# Patient Record
Sex: FEMALE | Race: WHITE | NOT HISPANIC OR LATINO | Employment: FULL TIME | URBAN - METROPOLITAN AREA
[De-identification: names, ages, dates, MRNs, and addresses within clinical notes are randomized per-mention and may not be internally consistent; named-entity substitution may affect disease eponyms.]

---

## 2024-08-14 ENCOUNTER — HOSPITAL ENCOUNTER (EMERGENCY)
Facility: OTHER | Age: 59
Discharge: HOME | End: 2024-08-14
Attending: EMERGENCY MEDICINE
Payer: COMMERCIAL

## 2024-08-14 VITALS
TEMPERATURE: 99 F | WEIGHT: 156 LBS | HEART RATE: 115 BPM | OXYGEN SATURATION: 99 % | BODY MASS INDEX: 24.48 KG/M2 | RESPIRATION RATE: 18 BRPM | SYSTOLIC BLOOD PRESSURE: 145 MMHG | DIASTOLIC BLOOD PRESSURE: 70 MMHG | HEIGHT: 67 IN

## 2024-08-14 DIAGNOSIS — N63.11 MASS OF UPPER OUTER QUADRANT OF RIGHT BREAST: Primary | ICD-10-CM

## 2024-08-14 PROCEDURE — 99281 EMR DPT VST MAYX REQ PHY/QHP: CPT

## 2024-08-14 NOTE — ED PROVIDER NOTES
Encounter Date: 8/14/2024       History     Chief Complaint   Patient presents with    Breast Mass     Pt reports noticing ~golf ball sized lump in R breast this AM, denies pain     Patient is a 59 y.o. female who presents to the ED for evaluation of lump in her right breast that she noticed this morning. Patient attempted to go to the [facility], but was unable to get an appointment. States that the mass is located on the lateral aspect of her right breast. Lump is non-tender. No known personal or family history of breast cancer. She denies skin changes to the breast.  She denies nipple discharge. She denies any recent vigorous or repetitive use of pectoral muscles.  No associated neck, back, or shoulder problems.  No associated fever or erythema.  No recent history of trauma to the chest.  Patient states that she has not noticed any swollen lymph nodes.    Denies fever, chills, chest pain, shortness of breath, wheezing, stridor, drooling, NVD, abdominal pain, constipation, urinary problems, joint problems, rashes, or any other complaints at this time.      The history is provided by the patient.     Review of patient's allergies indicates:  No Known Allergies  No past medical history on file.  No past surgical history on file.  No family history on file.     Review of Systems   Constitutional:  Negative for chills and fever.   Skin:  Negative for color change and rash.        Lump in right breast       Physical Exam     Initial Vitals [08/14/24 1029]   BP Pulse Resp Temp SpO2   (!) 190/105 (!) 122 18 99.3 °F (37.4 °C) 99 %      MAP       --         Physical Exam    Constitutional: She appears well-developed and well-nourished.   HENT:   Head: Normocephalic and atraumatic.   Pulmonary/Chest:   Right breast exhibits mass. Right breast exhibits no inverted nipple, no nipple discharge, no skin change and no tenderness. Breasts are symmetrical.     Neurological: She is alert.         ED Course    Procedures  Labs Reviewed - No data to display       Imaging Results    None          Medications - No data to display  Medical Decision Making  Patient is an afebrile, well appearing 59 y.o. female who presents for evaluation of a mass in her right breast. VSSPatient does not have a personal/family hx of breast cancer. Denies chest pain, SOB, or any other complaints at this time. A&Ox4. The patient remained comfortable and stable during their visit in the ED. Details of ED course documented in ED workup.     Differential diagnosis includes, but is not limited to:  Breast cancer, fibroadenoma, breast abscess, mastitis, cellulitis, mastitis, fibrocystic changes    All historical, clinical findings reviewed. Palpable nontender mass at 9 o'clock in the right breast. No surrounding warmth, erythema to suggest abscess or cellulitis. No nipple discharge.  Nipple is not inverted.  No peau d'orange.  Breasts are symmetrical, breasts are not warm or swollen.  No adenopathy is present in the cervical, supraclavicular, infraclavicular or axillary regions.     I believe that patient needs close follow up with OBGYN and/or breast surgery for an urgent mammogram as an outpatient.  I have placed a referral.  I also gave patient information about the woman's walk-in clinic.  There are no concerning features on physical exam to suggest an emergent or life threatening condition that requires immediate intervention in the emergency department. No further intervention is indicated at this time and I am of the belief that that it is safe to discharge the patient from the emergency department.     Patient has been counseled regarding the need for follow-up as well as the indications to return to the emergency room should new or worrisome developments (including but not limited to worsening pain, swelling, swelling, fever) occur. Discharge and follow-up instructions discussed with the patient who expressed understanding and willingness  to comply with recommendations. Patient discharged from the emergency department in stable condition, in no acute distress.               ED Course as of 08/14/24 1622   Wed Aug 14, 2024   1218 Patient is tachycardic, but appears to be anxious. No chest pain, SOB, fever.  [OB]      ED Course User Index  [OB] Yasmin Rodriguez PA-C                           Clinical Impression:  Final diagnoses:  [N63.11] Mass of upper outer quadrant of right breast (Primary)          ED Disposition Condition    Discharge           ED Prescriptions    None       Follow-up Information    None          Yasmin Rodriguez PA-C  08/14/24 1622

## 2024-08-14 NOTE — DISCHARGE INSTRUCTIONS
Thank you for letting me care for you today. I have placed an urgent referral to OBGYN and Breast Surgery.  You can call [phone] to schedule. Or schedule on the [employer] shona.     You can also try to make a same-day appointment at the [facility].   [facility] [place]  [address]  [place] [address]     To make a same-day appointment, call us at [phone]. You can also walk into our office.     Our goal at [facility] is to always give you outstanding care and exceptional service. You may receive a survey by mail or email in the next week about your experience in our ED. We would greatly appreciate you completing and returning the survey. Your feedback provides us with a way to recognize our staff who give very good care and it helps us learn how to improve when your experience was below our aspiration of excellence.     All the best,     Yasmni Rodriguez, MPH, PA-C  Emergency Department Physician Assistant  [facility]

## 2024-08-14 NOTE — FIRST PROVIDER EVALUATION
Emergency Department TeleTriage Encounter Note      CHIEF COMPLAINT    Chief Complaint   Patient presents with    Breast Mass     Pt reports noticing ~golf ball sized lump in R breast this AM, denies pain       VITAL SIGNS   Initial Vitals [08/14/24 1029]   BP Pulse Resp Temp SpO2   (!) 190/105 (!) 122 18 99.3 °F (37.4 °C) 99 %      MAP       --            ALLERGIES    Review of patient's allergies indicates:  No Known Allergies    PROVIDER TRIAGE NOTE  TeleTriage Note: Blossom Mccain, a nontoxic/well appearing, 59 y.o. female, presented to the ED with c/o lump to her right breast that she noticed this morning. Denies redness or pain to the area. Denies nipple discharge. No hx of breast cancer.     All ED beds are full at present; patient notified of this status.  Patient seen and medically screened by Nurse Practitioner via teletriage. Orders initiated at triage to expedite care.  Patient is stable to return to the waiting room and will be placed in an ED bed when available.  Care will be transferred to an alternate provider when patient has been placed in an Exam Room from the [facility] for physical exam, additional orders, and disposition.  10:32 AM Gela Choe DNP, FNP-C        ORDERS  Labs Reviewed - No data to display    ED Orders (720h ago, onward)      None              Virtual Visit Note: The provider triage portion of this emergency department evaluation and documentation was performed via [employer], a HIPAA-compliant telemedicine application, in concert with a tele-presenter in the room. A face to face patient evaluation with one of my colleagues will occur once the patient is placed in an emergency department room.      DISCLAIMER: This note was prepared with [employer] voice recognition transcription software. Garbled syntax, mangled pronouns, and other bizarre constructions may be attributed to that software system.

## 2024-08-14 NOTE — ED TRIAGE NOTES
"Patient reports feeling large, painless lump in right breast today. Last mammogram about 3 years ago, "WNL". Denie drainage, discoloration, swelling. No distress noted.  "

## 2024-08-15 ENCOUNTER — OFFICE VISIT (OUTPATIENT)
Dept: OBSTETRICS AND GYNECOLOGY | Facility: CLINIC | Age: 59
End: 2024-08-15
Payer: COMMERCIAL

## 2024-08-15 ENCOUNTER — HOSPITAL ENCOUNTER (OUTPATIENT)
Dept: RADIOLOGY | Facility: OTHER | Age: 59
Discharge: HOME | End: 2024-08-15
Payer: COMMERCIAL

## 2024-08-15 VITALS
DIASTOLIC BLOOD PRESSURE: 98 MMHG | WEIGHT: 149.25 LBS | SYSTOLIC BLOOD PRESSURE: 190 MMHG | HEIGHT: 67 IN | BODY MASS INDEX: 23.43 KG/M2

## 2024-08-15 DIAGNOSIS — N63.10 MASS OF RIGHT BREAST, UNSPECIFIED QUADRANT: ICD-10-CM

## 2024-08-15 DIAGNOSIS — N63.11 MASS OF UPPER OUTER QUADRANT OF RIGHT BREAST: ICD-10-CM

## 2024-08-15 DIAGNOSIS — N63.10 MASS OF RIGHT BREAST, UNSPECIFIED QUADRANT: Primary | ICD-10-CM

## 2024-08-15 DIAGNOSIS — R03.0 ELEVATED BP WITHOUT DIAGNOSIS OF HYPERTENSION: ICD-10-CM

## 2024-08-15 PROCEDURE — 99999 PR PBB SHADOW E&M-EST. PATIENT-LVL III: CPT | Mod: PBBFAC,,,

## 2024-08-15 PROCEDURE — 77062 BREAST TOMOSYNTHESIS BI: CPT | Mod: 26,,, | Performed by: RADIOLOGY

## 2024-08-15 PROCEDURE — 77062 BREAST TOMOSYNTHESIS BI: CPT | Mod: TC

## 2024-08-15 PROCEDURE — 76642 ULTRASOUND BREAST LIMITED: CPT | Mod: 26,RT,, | Performed by: RADIOLOGY

## 2024-08-15 PROCEDURE — 77066 DX MAMMO INCL CAD BI: CPT | Mod: 26,,, | Performed by: RADIOLOGY

## 2024-08-15 PROCEDURE — 77066 DX MAMMO INCL CAD BI: CPT | Mod: TC

## 2024-08-15 PROCEDURE — 76642 ULTRASOUND BREAST LIMITED: CPT | Mod: TC,RT

## 2024-08-15 NOTE — PROGRESS NOTES
History & Physical  Gynecology      SUBJECTIVE:     Chief Complaint:   Breast Pain (Lump in right breast )     History of Present Illness  Blossom Mccain is a 59 y.o. female to walk in clinic for breast lump.. Was seen by ED yesterday- no mammo ordered- has referral in to breast surgery. She reports she noticed right breast lump yesterday. No painful. She denies skin changes.  She denies nipple discharge. She is not breastfeeding or pumping.  She denies any lumps to her left breast.  Denies any recent vigorous or repetitive use of pectoral muscles.  No associated neck, back, or shoulder problems.  No associated fever or erythema.  No recent history of trauma to the chest.  She has not tried any alleviating factors.  The pain does not affect her ability to perform daily activities.  Denies family history of breast cancer.  Has had normal mammograms in the past- has not established care since moving here.     BP Readings from Last 2 Encounters:   08/15/24 (!) 190/98   24 (!) 145/70          Review of patient's allergies indicates:  No Known Allergies    Past Medical History:   Diagnosis Date    Essential (primary) hypertension     MVP (mitral valve prolapse)      Past Surgical History:   Procedure Laterality Date    TUBAL LIGATION       OB History          2    Para        Term                AB   1    Living   1         SAB   1    IAB        Ectopic        Multiple        Live Births                   No family history on file.  Social History     Tobacco Use    Smoking status: Never    Smokeless tobacco: Never   Substance Use Topics    Alcohol use: Yes       No current outpatient medications on file.     No current facility-administered medications for this visit.     Review of Systems:  Review of Systems   Constitutional:  Negative for fever.   Respiratory:  Negative for cough and shortness of breath.    Cardiovascular:  Negative for chest pain and leg swelling.   Integumentary:  Negative for  nipple discharge, breast skin changes and breast tenderness.   Breast: Positive for lump.Negative for nipple discharge, skin changes and tenderness       OBJECTIVE:     Physical Exam:  Physical Exam  Exam conducted with a chaperone present.   Pulmonary:      Effort: Pulmonary effort is normal.   Chest:   Breasts:     Right: Mass present. No inverted nipple, nipple discharge, skin change or tenderness.      Left: Normal. No nipple discharge, skin change or tenderness.          Comments: 3x3 cm breast lump- non TTP  Lymphadenopathy:      Upper Body:      Right upper body: No supraclavicular adenopathy.      Left upper body: No supraclavicular adenopathy.   Skin:     General: Skin is warm and dry.   Neurological:      Mental Status: She is alert and oriented to person, place, and time.   Psychiatric:         Behavior: Behavior normal.         ASSESSMENT:       ICD-10-CM ICD-9-CM    1. Mass of right breast, unspecified quadrant  N63.10 611.72 Mammo Digital Diagnostic Bilat with Stefano      US Breast Right Limited      2. Elevated BP without diagnosis of hypertension  R03.0 796.2 Ambulatory referral/consult to Internal Medicine        Plan:      - Diagnostic Mammo and ultrasound ordered  - Referral to internal medicine for primary care hx of MVP and HTN  - Breast surgery referral already in place      I spent a total of 20 minutes on the day of the visit.This includes face to face time and non-face to face time preparing to see the patient (eg, review of tests), Obtaining and/or reviewing separately obtained history, Documenting clinical information in the electronic or other health record, Independently interpreting resultsand communicating results to the patient/family/caregiver, or Care coordination     REMBERTO QuintanillaC

## 2024-08-21 ENCOUNTER — LAB VISIT (OUTPATIENT)
Dept: LAB | Facility: HOSPITAL | Age: 59
End: 2024-08-21
Attending: STUDENT IN AN ORGANIZED HEALTH CARE EDUCATION/TRAINING PROGRAM
Payer: COMMERCIAL

## 2024-08-21 ENCOUNTER — OFFICE VISIT (OUTPATIENT)
Dept: PRIMARY CARE CLINIC | Facility: CLINIC | Age: 59
End: 2024-08-21
Payer: COMMERCIAL

## 2024-08-21 VITALS
HEIGHT: 67 IN | DIASTOLIC BLOOD PRESSURE: 106 MMHG | OXYGEN SATURATION: 100 % | HEART RATE: 102 BPM | WEIGHT: 148.81 LBS | SYSTOLIC BLOOD PRESSURE: 178 MMHG | BODY MASS INDEX: 23.36 KG/M2 | TEMPERATURE: 98 F

## 2024-08-21 DIAGNOSIS — Z00.00 ANNUAL PHYSICAL EXAM: Primary | ICD-10-CM

## 2024-08-21 DIAGNOSIS — Z00.00 ANNUAL PHYSICAL EXAM: ICD-10-CM

## 2024-08-21 DIAGNOSIS — I34.1 MITRAL VALVE PROLAPSE: ICD-10-CM

## 2024-08-21 DIAGNOSIS — I10 PRIMARY HYPERTENSION: ICD-10-CM

## 2024-08-21 DIAGNOSIS — R03.0 ELEVATED BP WITHOUT DIAGNOSIS OF HYPERTENSION: ICD-10-CM

## 2024-08-21 LAB
ALBUMIN SERPL BCP-MCNC: 4.4 G/DL (ref 3.5–5.2)
ALP SERPL-CCNC: 52 U/L (ref 55–135)
ALT SERPL W/O P-5'-P-CCNC: 19 U/L (ref 10–44)
ANION GAP SERPL CALC-SCNC: 12 MMOL/L (ref 8–16)
AST SERPL-CCNC: 31 U/L (ref 10–40)
BASOPHILS # BLD AUTO: 0.05 K/UL (ref 0–0.2)
BASOPHILS NFR BLD: 1.4 % (ref 0–1.9)
BILIRUB SERPL-MCNC: 0.6 MG/DL (ref 0.1–1)
BUN SERPL-MCNC: 6 MG/DL (ref 6–20)
CALCIUM SERPL-MCNC: 10 MG/DL (ref 8.7–10.5)
CHLORIDE SERPL-SCNC: 101 MMOL/L (ref 95–110)
CHOLEST SERPL-MCNC: 238 MG/DL (ref 120–199)
CHOLEST/HDLC SERPL: 1.8 {RATIO} (ref 2–5)
CO2 SERPL-SCNC: 25 MMOL/L (ref 23–29)
CREAT SERPL-MCNC: 0.7 MG/DL (ref 0.5–1.4)
DIFFERENTIAL METHOD BLD: ABNORMAL
EOSINOPHIL # BLD AUTO: 0.1 K/UL (ref 0–0.5)
EOSINOPHIL NFR BLD: 2.5 % (ref 0–8)
ERYTHROCYTE [DISTWIDTH] IN BLOOD BY AUTOMATED COUNT: 12.7 % (ref 11.5–14.5)
EST. GFR  (NO RACE VARIABLE): >60 ML/MIN/1.73 M^2
ESTIMATED AVG GLUCOSE: 97 MG/DL (ref 68–131)
GLUCOSE SERPL-MCNC: 101 MG/DL (ref 70–110)
HBA1C MFR BLD: 5 % (ref 4–5.6)
HCT VFR BLD AUTO: 41.7 % (ref 37–48.5)
HDLC SERPL-MCNC: 135 MG/DL (ref 40–75)
HDLC SERPL: 56.7 % (ref 20–50)
HGB BLD-MCNC: 13.4 G/DL (ref 12–16)
IMM GRANULOCYTES # BLD AUTO: 0.01 K/UL (ref 0–0.04)
IMM GRANULOCYTES NFR BLD AUTO: 0.3 % (ref 0–0.5)
LDLC SERPL CALC-MCNC: 96.6 MG/DL (ref 63–159)
LYMPHOCYTES # BLD AUTO: 1.2 K/UL (ref 1–4.8)
LYMPHOCYTES NFR BLD: 32.7 % (ref 18–48)
MCH RBC QN AUTO: 32.1 PG (ref 27–31)
MCHC RBC AUTO-ENTMCNC: 32.1 G/DL (ref 32–36)
MCV RBC AUTO: 100 FL (ref 82–98)
MONOCYTES # BLD AUTO: 0.5 K/UL (ref 0.3–1)
MONOCYTES NFR BLD: 13.9 % (ref 4–15)
NEUTROPHILS # BLD AUTO: 1.8 K/UL (ref 1.8–7.7)
NEUTROPHILS NFR BLD: 49.2 % (ref 38–73)
NONHDLC SERPL-MCNC: 103 MG/DL
NRBC BLD-RTO: 0 /100 WBC
PLATELET # BLD AUTO: 296 K/UL (ref 150–450)
PMV BLD AUTO: 8.9 FL (ref 9.2–12.9)
POTASSIUM SERPL-SCNC: 4.3 MMOL/L (ref 3.5–5.1)
PROT SERPL-MCNC: 8 G/DL (ref 6–8.4)
RBC # BLD AUTO: 4.18 M/UL (ref 4–5.4)
SODIUM SERPL-SCNC: 138 MMOL/L (ref 136–145)
TRIGL SERPL-MCNC: 32 MG/DL (ref 30–150)
TSH SERPL DL<=0.005 MIU/L-ACNC: 0.83 UIU/ML (ref 0.4–4)
WBC # BLD AUTO: 3.67 K/UL (ref 3.9–12.7)

## 2024-08-21 PROCEDURE — 3008F BODY MASS INDEX DOCD: CPT | Mod: CPTII,S$GLB,, | Performed by: STUDENT IN AN ORGANIZED HEALTH CARE EDUCATION/TRAINING PROGRAM

## 2024-08-21 PROCEDURE — 1159F MED LIST DOCD IN RCRD: CPT | Mod: CPTII,S$GLB,, | Performed by: STUDENT IN AN ORGANIZED HEALTH CARE EDUCATION/TRAINING PROGRAM

## 2024-08-21 PROCEDURE — 1160F RVW MEDS BY RX/DR IN RCRD: CPT | Mod: CPTII,S$GLB,, | Performed by: STUDENT IN AN ORGANIZED HEALTH CARE EDUCATION/TRAINING PROGRAM

## 2024-08-21 PROCEDURE — 3080F DIAST BP >= 90 MM HG: CPT | Mod: CPTII,S$GLB,, | Performed by: STUDENT IN AN ORGANIZED HEALTH CARE EDUCATION/TRAINING PROGRAM

## 2024-08-21 PROCEDURE — 36415 COLL VENOUS BLD VENIPUNCTURE: CPT | Mod: PN | Performed by: STUDENT IN AN ORGANIZED HEALTH CARE EDUCATION/TRAINING PROGRAM

## 2024-08-21 PROCEDURE — 84443 ASSAY THYROID STIM HORMONE: CPT | Performed by: STUDENT IN AN ORGANIZED HEALTH CARE EDUCATION/TRAINING PROGRAM

## 2024-08-21 PROCEDURE — 85025 COMPLETE CBC W/AUTO DIFF WBC: CPT | Performed by: STUDENT IN AN ORGANIZED HEALTH CARE EDUCATION/TRAINING PROGRAM

## 2024-08-21 PROCEDURE — 83036 HEMOGLOBIN GLYCOSYLATED A1C: CPT | Performed by: STUDENT IN AN ORGANIZED HEALTH CARE EDUCATION/TRAINING PROGRAM

## 2024-08-21 PROCEDURE — 3077F SYST BP >= 140 MM HG: CPT | Mod: CPTII,S$GLB,, | Performed by: STUDENT IN AN ORGANIZED HEALTH CARE EDUCATION/TRAINING PROGRAM

## 2024-08-21 PROCEDURE — 80061 LIPID PANEL: CPT | Performed by: STUDENT IN AN ORGANIZED HEALTH CARE EDUCATION/TRAINING PROGRAM

## 2024-08-21 PROCEDURE — 99386 PREV VISIT NEW AGE 40-64: CPT | Mod: S$GLB,,, | Performed by: STUDENT IN AN ORGANIZED HEALTH CARE EDUCATION/TRAINING PROGRAM

## 2024-08-21 PROCEDURE — 99999 PR PBB SHADOW E&M-EST. PATIENT-LVL IV: CPT | Mod: PBBFAC,,, | Performed by: STUDENT IN AN ORGANIZED HEALTH CARE EDUCATION/TRAINING PROGRAM

## 2024-08-21 PROCEDURE — 80053 COMPREHEN METABOLIC PANEL: CPT | Performed by: STUDENT IN AN ORGANIZED HEALTH CARE EDUCATION/TRAINING PROGRAM

## 2024-08-21 RX ORDER — AMLODIPINE BESYLATE 5 MG/1
5 TABLET ORAL DAILY
Qty: 30 TABLET | Refills: 2 | Status: SHIPPED | OUTPATIENT
Start: 2024-08-21 | End: 2025-08-21

## 2024-08-21 NOTE — PATIENT INSTRUCTIONS
Concepción Labat  [phone]  [address]  [place] [address]    Kerry Hegarty  [phone]  [address] [place] [address]  Available in mornings  [facility]    [facility]  [address]  [phone]    [facility]  [address] [place], [place]  [id number] [phone]     Merry Lugo  [phone]    Lilian Lefort  [phone]  [address]  [address]  [place] [address]    Louise Shirley  Substance abuse clinical psychologist    Dona Vyas, PhD, MSCP, Medical Psychologist  [address] [place] [address], [address] [place] [address]  Phone or Text: [phone] Fax: [phone]  [facility]  [facility]  [address] [place] [address]    [phone]     Donte Ormond  [address] [place] [address]   [phone]     Brandynicoleconcha Abelino  [facility]  [phone]    Oneyda Martins  [address], [place], [place] [id number] [phone]    [facility]  [phone]   [address] [place] [address]    [facility]   [email or web address]   [phone]   [address] [place] [address]     [facility] Psychiat[facility]   5 providers   [address]   [place], [place] [id number] [phone]   To make an Appointment     [facility]   Psychologists   [address] [facility] [address] [place] [address]   [facility]     [facility]   [address] [place] [address]   Phone: [phone], open 9:00am - 5:00pm     Florencio Soriano, PhD,   [phone] [address] [place] [address]   Open: 8:00am - 5:00pm     [employer]   Phone number   [phone]   [email or web address]   [address] [place] [address]   Open: 9:00am - 7:00pm     [facility]patient [facility]iat[facility]   Phone [phone] [address] [place] [address]

## 2024-08-21 NOTE — PROGRESS NOTES
"Office visit  Patient: Blossom Mccain   8/21/2024     Assessment:     1. Annual physical exam    2. Primary hypertension    3. Mitral valve prolapse      Plan:       1. Annual physical exam  -     TSH; Future; Expected date: 08/21/2024  -     Hemoglobin A1C; Future; Expected date: 08/21/2024  -     Lipid Panel; Future; Expected date: 08/21/2024  -     Comprehensive Metabolic Panel; Future; Expected date: 08/21/2024  -     CBC Auto Differential; Future; Expected date: 08/21/2024        -Discussed healthy habits and recommended preventative screening    2. Primary hypertension  -     amLODIPine (NORVASC) 5 MG tablet; Take 1 tablet (5 mg total) by mouth once daily.  Dispense: 30 tablet; Refill: 2        -uncontrolled; will start on amlodipine; counseled on risks/benefits/side effects of medication    3. Mitral valve prolapse  -     Echo; Future        -murmur heard on exam          Return to clinic in 2 weeks for follow up hypertension or sooner as needed.      CHIEF COMPLAINT: establish care    HPI: Blossom Mccain is a 59 y.o. female who presents to establish care.  She presents with significantly elevated blood pressure. Was also elevated at last visit with Ob/gyn.  Reports history of high blood pressure.    She reports long-standing anxiety.  Was on Wellbutrin in the past, but is not sure if it helped. Last on it a year ago.  Was on Effexor, but bad side effects.    She used to be on metoprolol for mitral valve prolapse and then it helped with her BP.     Last colonoscopy when she turned 52 - showed diverticulosis but no polyps.  Not interest in shingles vaccine.  Last Pap smear 3-4 years ago.      Current Outpatient Medications   Medication Instructions    amLODIPine (NORVASC) 5 mg, Oral, Daily       No results found for: "HGBA1C"  No results found for: "MICALBCREAT"  No results found for: "LDLCALC", "CHOL", "HDL", "TRIG"    No results found for: "NA", "K", "CL", "CO2", "GLU", "BUN", "CREATININE", "CALCIUM", "PROT", " ""ALBUMIN", "BILITOT", "ALKPHOS", "AST", "ALT", "ANIONGAP", "ESTGFRAFRICA", "EGFRNONAA", "WBC", "HGB", "HCT", "MCV", "PLT", "TSH", "PSA", "PSADIAG", "HEPCAB"    No results found for: "LH", "FSH", "TOTALTESTOST", "PROGESTERONE", "ESTRADIOL", "[id number]", "[id number]", "FERRITIN", "IRON", "TRANSFERRIN", "TIBC", "FESATURATED", "ZINC"      Past Medical History:   Diagnosis Date    Essential (primary) hypertension     MVP (mitral valve prolapse)      Past Surgical History:   Procedure Laterality Date    TUBAL LIGATION         Social History     Tobacco Use    Smoking status: Never    Smokeless tobacco: Never   Substance Use Topics    Alcohol use: Yes       family history is not on file.    Vitals:    08/21/24 0919   BP: (!) 178/106   Pulse: 102   Temp: 98 °F (36.7 °C)   TempSrc: Oral   SpO2: 100%   Weight: 67.5 kg (148 lb 13 oz)   Height: 5' 7" (1.702 m)   PainSc: 0-No pain     Objective:   Physical Exam  Vitals reviewed.   Constitutional:       General: She is not in acute distress.     Appearance: Normal appearance. She is well-developed.   HENT:      Head: Normocephalic and atraumatic.      Right Ear: External ear normal.      Left Ear: External ear normal.      Nose: Nose normal.      Mouth/Throat:      Mouth: Mucous membranes are moist.   Eyes:      General: No scleral icterus.        Right eye: No discharge.         Left eye: No discharge.      Conjunctiva/sclera: Conjunctivae normal.   Cardiovascular:      Rate and Rhythm: Normal rate and regular rhythm.      Heart sounds: Murmur (heard in LUSB and RUSB; soft, blowing) heard.      No friction rub. No gallop.   Pulmonary:      Effort: Pulmonary effort is normal. No respiratory distress.      Breath sounds: Normal breath sounds. No wheezing, rhonchi or rales.   Musculoskeletal:         General: No deformity.      Right lower leg: No edema.      Left lower leg: No edema.   Skin:     General: Skin is warm and dry.   Neurological:      General: No focal deficit " present.      Mental Status: She is alert and oriented to person, place, and time.   Psychiatric:         Mood and Affect: Mood normal.         Behavior: Behavior normal.         Thought Content: Thought content normal.             Funmilayo Beebe MD  Internal Medicine and Pediatrics

## 2024-08-23 ENCOUNTER — HOSPITAL ENCOUNTER (OUTPATIENT)
Dept: RADIOLOGY | Facility: OTHER | Age: 59
Discharge: HOME | End: 2024-08-23
Payer: COMMERCIAL

## 2024-08-23 DIAGNOSIS — R92.8 ABNORMAL MAMMOGRAM: ICD-10-CM

## 2024-08-23 PROCEDURE — A4648 IMPLANTABLE TISSUE MARKER: HCPCS

## 2024-08-23 PROCEDURE — 88305 TISSUE EXAM BY PATHOLOGIST: CPT | Performed by: PATHOLOGY

## 2024-08-23 PROCEDURE — 88342 IMHCHEM/IMCYTCHM 1ST ANTB: CPT | Performed by: PATHOLOGY

## 2024-08-23 PROCEDURE — 88360 TUMOR IMMUNOHISTOCHEM/MANUAL: CPT | Mod: 26,,, | Performed by: PATHOLOGY

## 2024-08-23 PROCEDURE — 19083 BX BREAST 1ST LESION US IMAG: CPT | Mod: RT,,, | Performed by: RADIOLOGY

## 2024-08-23 PROCEDURE — 88341 IMHCHEM/IMCYTCHM EA ADD ANTB: CPT | Mod: 26,59,, | Performed by: PATHOLOGY

## 2024-08-23 PROCEDURE — 88341 IMHCHEM/IMCYTCHM EA ADD ANTB: CPT | Performed by: PATHOLOGY

## 2024-08-23 PROCEDURE — 88305 TISSUE EXAM BY PATHOLOGIST: CPT | Mod: 26,,, | Performed by: PATHOLOGY

## 2024-08-23 PROCEDURE — 88342 IMHCHEM/IMCYTCHM 1ST ANTB: CPT | Mod: 26,59,, | Performed by: PATHOLOGY

## 2024-08-23 PROCEDURE — 25000003 PHARM REV CODE 250

## 2024-08-23 PROCEDURE — 77065 DX MAMMO INCL CAD UNI: CPT | Mod: 26,RT,, | Performed by: RADIOLOGY

## 2024-08-23 PROCEDURE — 77065 DX MAMMO INCL CAD UNI: CPT | Mod: TC,RT

## 2024-08-23 PROCEDURE — 19083 BX BREAST 1ST LESION US IMAG: CPT | Mod: RT

## 2024-08-23 PROCEDURE — 88360 TUMOR IMMUNOHISTOCHEM/MANUAL: CPT | Performed by: PATHOLOGY

## 2024-08-23 RX ORDER — LIDOCAINE HYDROCHLORIDE AND EPINEPHRINE 20; 10 MG/ML; UG/ML
10 INJECTION, SOLUTION INFILTRATION; PERINEURAL ONCE
Status: COMPLETED | OUTPATIENT
Start: 2024-08-23 | End: 2024-08-23

## 2024-08-23 RX ORDER — LIDOCAINE HYDROCHLORIDE 10 MG/ML
5 INJECTION, SOLUTION INFILTRATION; PERINEURAL ONCE
Status: COMPLETED | OUTPATIENT
Start: 2024-08-23 | End: 2024-08-23

## 2024-08-23 RX ADMIN — LIDOCAINE HYDROCHLORIDE,EPINEPHRINE BITARTRATE 10 ML: 20; .01 INJECTION, SOLUTION INFILTRATION; PERINEURAL at 10:08

## 2024-08-23 RX ADMIN — LIDOCAINE HYDROCHLORIDE 5 ML: 10 INJECTION, SOLUTION EPIDURAL; INFILTRATION; INTRACAUDAL at 10:08

## 2024-08-27 ENCOUNTER — HOSPITAL ENCOUNTER (OUTPATIENT)
Dept: CARDIOLOGY | Facility: OTHER | Age: 59
Discharge: HOME | End: 2024-08-27
Attending: STUDENT IN AN ORGANIZED HEALTH CARE EDUCATION/TRAINING PROGRAM
Payer: COMMERCIAL

## 2024-08-27 ENCOUNTER — TELEPHONE (OUTPATIENT)
Dept: RADIOLOGY | Facility: OTHER | Age: 59
End: 2024-08-27
Payer: COMMERCIAL

## 2024-08-27 ENCOUNTER — DOCUMENTATION ONLY (OUTPATIENT)
Dept: SURGERY | Facility: CLINIC | Age: 59
End: 2024-08-27
Payer: COMMERCIAL

## 2024-08-27 VITALS
HEIGHT: 67 IN | WEIGHT: 148 LBS | SYSTOLIC BLOOD PRESSURE: 178 MMHG | DIASTOLIC BLOOD PRESSURE: 106 MMHG | BODY MASS INDEX: 23.23 KG/M2

## 2024-08-27 DIAGNOSIS — I34.1 MITRAL VALVE PROLAPSE: ICD-10-CM

## 2024-08-27 LAB
AORTIC ROOT ANNULUS: 2.53 CM
ASCENDING AORTA: 2.3 CM
AV INDEX (PROSTH): 0.61
AV MEAN GRADIENT: 10 MMHG
AV PEAK GRADIENT: 20 MMHG
AV VALVE AREA BY VELOCITY RATIO: 1.61 CM²
AV VALVE AREA: 1.54 CM²
AV VELOCITY RATIO: 0.64
BSA FOR ECHO PROCEDURE: 1.78 M2
CV ECHO LV RWT: 0.5 CM
DOP CALC AO PEAK VEL: 2.26 M/S
DOP CALC AO VTI: 40.1 CM
DOP CALC LVOT AREA: 2.5 CM2
DOP CALC LVOT DIAMETER: 1.79 CM
DOP CALC LVOT PEAK VEL: 1.45 M/S
DOP CALC LVOT STROKE VOLUME: 61.62 CM3
DOP CALCLVOT PEAK VEL VTI: 24.5 CM
E WAVE DECELERATION TIME: 107.71 MSEC
E/A RATIO: 0.88
E/E' RATIO: 6.53 M/S
ECHO LV POSTERIOR WALL: 1.04 CM (ref 0.6–1.1)
EJECTION FRACTION: 65 %
FRACTIONAL SHORTENING: 34 % (ref 28–44)
GLOBAL LONGITUIDAL STRAIN: 21 %
INTERVENTRICULAR SEPTUM: 1.09 CM (ref 0.6–1.1)
IVC DIAMETER: 1.57 CM
LA MAJOR: 5.08 CM
LA MINOR: 4.82 CM
LA WIDTH: 4 CM
LAAS-AP2: 23 CM2
LAAS-AP4: 20 CM2
LEFT ATRIUM AREA SYSTOLIC (APICAL 2 CHAMBER): 23.44 CM2
LEFT ATRIUM AREA SYSTOLIC (APICAL 4 CHAMBER): 20.49 CM2
LEFT ATRIUM SIZE: 4.02 CM
LEFT ATRIUM VOLUME INDEX: 38 ML/M2
LEFT ATRIUM VOLUME: 67.61 CM3
LEFT INTERNAL DIMENSION IN SYSTOLE: 2.74 CM (ref 2.1–4)
LEFT VENTRICLE DIASTOLIC VOLUME INDEX: 43.6 ML/M2
LEFT VENTRICLE DIASTOLIC VOLUME: 77.6 ML
LEFT VENTRICLE END SYSTOLIC VOLUME APICAL 2 CHAMBER: 78.28 ML
LEFT VENTRICLE END SYSTOLIC VOLUME APICAL 4 CHAMBER: 57.96 ML
LEFT VENTRICLE MASS INDEX: 84 G/M2
LEFT VENTRICLE SYSTOLIC VOLUME INDEX: 15.8 ML/M2
LEFT VENTRICLE SYSTOLIC VOLUME: 28.14 ML
LEFT VENTRICULAR INTERNAL DIMENSION IN DIASTOLE: 4.18 CM (ref 3.5–6)
LEFT VENTRICULAR MASS: 148.87 G
LV LATERAL E/E' RATIO: 7.75 M/S
LV SEPTAL E/E' RATIO: 5.64 M/S
LVED V (TEICH): 77.6 ML
LVES V (TEICH): 28.14 ML
LVOT MG: 4.98 MMHG
LVOT MV: 1.06 CM/S
MV PEAK A VEL: 1.41 M/S
MV PEAK E VEL: 1.24 M/S
MV STENOSIS PRESSURE HALF TIME: 31.24 MS
MV VALVE AREA P 1/2 METHOD: 7.04 CM2
OHS CV RV/LV RATIO: 0.37 CM
PISA MRMAX VEL: 4.99 M/S
PV MV: 1.01 M/S
PV PEAK GRADIENT: 7 MMHG
PV PEAK VELOCITY: 1.29 M/S
RA MAJOR: 3.88 CM
RA PRESSURE ESTIMATED: 3 MMHG
RA WIDTH: 2.8 CM
RIGHT VENTRICULAR END-DIASTOLIC DIMENSION: 1.55 CM
SINUS: 2.2 CM
STJ: 2.45 CM
TDI LATERAL: 0.16 M/S
TDI SEPTAL: 0.22 M/S
TDI: 0.19 M/S
TRICUSPID ANNULAR PLANE SYSTOLIC EXCURSION: 1.7 CM
Z-SCORE OF LEFT VENTRICULAR DIMENSION IN END DIASTOLE: -1.63
Z-SCORE OF LEFT VENTRICULAR DIMENSION IN END SYSTOLE: -0.83

## 2024-08-27 PROCEDURE — 93306 TTE W/DOPPLER COMPLETE: CPT

## 2024-08-27 PROCEDURE — 93356 MYOCRD STRAIN IMG SPCKL TRCK: CPT | Mod: ,,, | Performed by: INTERNAL MEDICINE

## 2024-08-27 PROCEDURE — 93306 TTE W/DOPPLER COMPLETE: CPT | Mod: 26,,, | Performed by: INTERNAL MEDICINE

## 2024-08-27 PROCEDURE — 93356 MYOCRD STRAIN IMG SPCKL TRCK: CPT

## 2024-08-27 NOTE — PROGRESS NOTES
Notified by MARICARMEN Salazar RN that patient had positive breast biopsy and needed to be scheduled for breast surgery appointment.  Called patient and scheduled her for 9/5 appointment with Dr. Olsen and 8/28 virtual path review appointment with CATA Ramirez NP.  Patient verbalized understanding.  Asked patient for any questions.  Patient denied at this time.      Oncology Navigation   Intake  Date of Diagnosis: 08/23/24  Cancer Type: Breast  Type of Referral: Internal  Date of Referral: 08/27/24  Initial Nurse Navigator Contact: 08/27/24  Referral to Initial Contact Timeline (days): 0  First Appointment Available: 09/05/24  Appointment Date: 09/05/24  First Available Date vs. Scheduled Date (days): 0     Treatment  Current Status: Staging work-up    Surgical Oncologist: Dr. Olsen  Consult Date: 09/05/24          Procedures: Biopsy; Diagnostic Mammogram; Ultrasound  Biopsy Schedule Date: 08/23/24  Diagnostic Mammo Schedule Date: 08/15/24  Ultrasound Schedule Date: 08/15/24       ER: Positive  KS: Negative           Acuity      Follow Up  Follow up in about 9 days (around 9/5/2024) for Raúl Posey.

## 2024-08-27 NOTE — TELEPHONE ENCOUNTER
Patient notified of right breast biopsy results per pathology reported on 8/27/24. Diagnosis: INVASIVE DUCTAL CARCINOMA; DCIS. Explained to patient results are positive for breast cancer. Instructed on need for consultation with breast surgeon. Questions answered. Appointment with breast surgeon requested. Office will call patient directly to confirm appointment. Patient verbalized understanding. Biopsy site WNL. Encouraged to call [phone] for further questions or concerns.

## 2024-08-27 NOTE — TELEPHONE ENCOUNTER
Called pt to review breast biopsy results. Pt states she is in [employer] right now, would like to call back in about an hour when she is able to talk. Pt has my direct number to call.

## 2024-08-28 ENCOUNTER — OFFICE VISIT (OUTPATIENT)
Dept: HEMATOLOGY/ONCOLOGY | Facility: CLINIC | Age: 59
End: 2024-08-28
Payer: COMMERCIAL

## 2024-08-28 DIAGNOSIS — N63.11 MASS OF UPPER OUTER QUADRANT OF RIGHT BREAST: ICD-10-CM

## 2024-08-28 DIAGNOSIS — C50.911 INVASIVE DUCTAL CARCINOMA OF BREAST, FEMALE, RIGHT: Primary | ICD-10-CM

## 2024-08-28 DIAGNOSIS — D05.11 DUCTAL CARCINOMA IN SITU (DCIS) OF RIGHT BREAST: ICD-10-CM

## 2024-08-28 LAB
FINAL PATHOLOGIC DIAGNOSIS: NORMAL
GROSS: NORMAL
Lab: NORMAL
SUPPLEMENTAL DIAGNOSIS: NORMAL

## 2024-08-28 PROCEDURE — 99214 OFFICE O/P EST MOD 30 MIN: CPT | Mod: 95,,, | Performed by: NURSE PRACTITIONER

## 2024-08-28 PROCEDURE — G2211 COMPLEX E/M VISIT ADD ON: HCPCS | Mod: 95,,, | Performed by: NURSE PRACTITIONER

## 2024-08-28 PROCEDURE — 3044F HG A1C LEVEL LT 7.0%: CPT | Mod: CPTII,95,, | Performed by: NURSE PRACTITIONER

## 2024-08-28 NOTE — PROGRESS NOTES
Reason For Consultation:   Increased lifetime risk of breast cancer    Referring Provider:   Yasmin Rodriguez PA-C  [address] [place] [address]    Records Obtained: Records of the patients history including those obtained from the referring provider were reviewed and summarized in detail.    HPI:   Bolssom Mccain presents for pathology review.  She self-palpated a lump in her right breast on 24.  She went to the ER and then to her gynecologist.  A bilateral diagnotic mammogram and right breast ultrasound were performed on 8/15/24 showin cm x 1.8 cm x 2.1 cm irregularly shaped, hypoechoic mass with angular margins seen in the right breast at 10 o'clock, 6 cm from the nipple. An ultrasound guided biopsy was recommended and performed on 24.  See pathology below:        Component 5 d ago   Final Pathologic Diagnosis     RIGHT BREAST, 10:00, 6 CFN, BIOPSY:  - Invasive ductal carcinoma, grade 3, longest linear length is 10 mm measured on the slide.       - Histologic Grade (Chay Histologic Score)                 Glandular (Acinar/Tubular Differentiation): 3.                 Nuclear Pleomorphism: 3.                 Mitotic Rate: 2.            Overall Grade: Grade 3 (score 8).  - Ductal carcinoma in situ (DCIS), high nuclear grade, solid and cribriform type, without associated necrosis.  - Lymphovascular invasion: Not identified.  - Microcalcifications: Not identified.    Ancillary studies- (Performed on block )  P63: Negative in the invasive carcinoma, supporting the diagnosis.  Ecadherin: Positive, supporting ductal origin.  Estrogen receptor: Positive (strong intensity, >95% of tumor cell origin).  Progesterone receptor: Negative, <1%.  HER2 IHC: Equivocal, 2+.  HER2 Dual NICHELLE Pending, results will be reported in a supplemental.  Ki-67: 40%.         She is feeling anxious/nervous about her recent results, but otherwise if feeling generally well today.  Reports some generalized anxiety at  "baseline, which is now increased.    History:   - Age: 59 y.o.   - Height:  5'7"  - Weight: 148 lbs, lost 50 lbs in the last two years (had a much more active/physical job)  - Age at menarche:  16  - Number of pregnancies: ; age of first live birth: 25  - History of breast feeding: No.   - Age at menopause, if applicable:  56   -Uterus and ovaries intact: Yes  - HRT: No    No other cancer in the past, precancerous mole in  or   First breast biopsy  - family history of breast cancer: none    Social History:  Tobacco use:  none  Alcohol use:  2 to 3 mixed drinks per night    Past Medical   Past Medical History:   Diagnosis Date    Essential (primary) hypertension     MVP (mitral valve prolapse)      Patient Active Problem List   Diagnosis    Primary hypertension     Social History   Social History     Tobacco Use    Smoking status: Never    Smokeless tobacco: Never   Substance Use Topics    Alcohol use: Yes     Family History  No family history on file.  Medications    Current Outpatient Medications:     amLODIPine (NORVASC) 5 MG tablet, Take 1 tablet (5 mg total) by mouth once daily., Disp: 30 tablet, Rfl: 2  Allergies  Review of patient's allergies indicates:  No Known Allergies    Review of Systems       See above   Review of Systems  Review of Systems  All other systems reviewed and are negative.    Objective:      Vitals: There were no vitals filed for this visit.  BMI: There is no height or weight on file to calculate BMI.   There is no height or weight on file to calculate BSA.    Limited pe d/t virtual visit    Physical Exam  Constitutional:       General: She is not in acute distress.     Appearance: Normal appearance. She is not ill-appearing, toxic-appearing or diaphoretic.   HENT:      Head: Normocephalic and atraumatic.   Pulmonary:      Effort: Pulmonary effort is normal. No respiratory distress.   Neurological:      Mental Status: She is alert and oriented to person, place, and time. "       Laboratory Data: reviewed most recent   Imaging: reviewed most recent    Assessment:     1. Invasive ductal carcinoma of breast, female, right    2. Mass of upper outer quadrant of right breast    3. Ductal carcinoma in situ (DCIS) of right breast        Plan:     Pathology reviewed today  Fish pending for HER 2 status  Pt scheduled with breast surgery team on 9/5/24 for surgical discussion  Grade 3, high Ki67  May need med/onc and rad/onc visit as well pending Her 2 results    Breast cancer- discussion.   Breast anatomy discussed.   Different providers discussed including surgical, medical and radiation oncologist.   Path results explained in detail. General education discussed as well- see below.   Infiltrating ductal carcinomas are divided into three grades based upon a combination of architectural and cytologic features, usually assessed utilizing a scoring system based on three parameters:  ?Well-differentiated (grade 1) - Well-differentiated tumors have cells that infiltrate the stroma as solid nests of glands. The nuclei are relatively uniform with little or no evidence of mitotic activity   ?Moderately differentiated (grade 2) - Moderately differentiated tumors have cells that infiltrate as solid nests with some glandular differentiation. There is some nuclear pleomorphism and a moderate mitotic rate   ?Poorly differentiated (grade 3) - Poorly differentiated tumors are composed of solid nests of neoplastic cells without evidence of gland formation. There is marked nuclear atypia and considerable mitotic activity         Estrogen and Progesterone receptors- indicated the hormone status of breast cancer. The cells of this type of breast cancer have receptors that allow them to use hormones to grow.       HER2-positive breast cancer is a breast cancer that tests positive for a protein called human epidermal growth factor receptor 2 (HER2). This protein promotes the growth of cancer cells.   Amplification  or overexpression of the human epidermal growth factor receptor 2 (HER2) oncogene is present in approximately 15 percent of primary invasive breast cancer .   Treatments that specifically target HER2 are very effective.      Ki-67 is a way to measure how fast cancer cells in a tumor are dividing. Ki-67 is a protein that is found only in cells that are dividing. A high Ki-67 proliferation index means many cells are dividing quickly and that the cancer is likely to grow and spread. A Ki-67 proliferation index over 30% is typically considered high.      Patient is in agreement with the proposed treatment plan. All questions were answered to the patient's satisfaction. Patient knows to call clinic for any new or worsening symptoms and if anything is needed before the next clinic visit.    ALANNA Kinney      Med Onc Chart Routing      Follow up with physician . Scheduled with Dr. Olsen 9/4   Follow up with SUSIE    Infusion scheduling note    Injection scheduling note    Labs    Imaging    Pharmacy appointment    Other referrals                ALANNA Kinney  Hematology & Medical Oncology   [facility] [place] [address]  ph. [phone]  Fax. [phone]    Total time spent with the patient: 30 minutes, 20 minutes of face to face consultation and 10 minutes of chart review and coordination of care, on the day of the visit. This includes face to face time and non-face to face time preparing to see the patient (eg, review of tests), obtaining and/or reviewing separately obtained history, documenting clinical information in the electronic or other health record, independently interpreting resultsand communicating results to the patient/family/caregiver, or care coordination.

## 2024-08-29 ENCOUNTER — TELEPHONE (OUTPATIENT)
Dept: PRIMARY CARE CLINIC | Facility: CLINIC | Age: 59
End: 2024-08-29
Payer: COMMERCIAL

## 2024-08-29 ENCOUNTER — TELEPHONE (OUTPATIENT)
Dept: OBSTETRICS AND GYNECOLOGY | Facility: CLINIC | Age: 59
End: 2024-08-29
Payer: COMMERCIAL

## 2024-08-29 NOTE — TELEPHONE ENCOUNTER
Spoke with patient, offered support- saw hem/onc yesterday- has consult with Dr. Olsen 9/4. Questions answered.

## 2024-08-29 NOTE — TELEPHONE ENCOUNTER
----- Message from Funmilayo Beebe MD sent at 8/28/2024  2:09 PM CDT -----  Please let patient know that her echo was normal.

## 2024-09-04 ENCOUNTER — OFFICE VISIT (OUTPATIENT)
Dept: PRIMARY CARE CLINIC | Facility: CLINIC | Age: 59
End: 2024-09-04
Payer: COMMERCIAL

## 2024-09-04 VITALS
SYSTOLIC BLOOD PRESSURE: 160 MMHG | WEIGHT: 151 LBS | HEART RATE: 99 BPM | HEIGHT: 67 IN | OXYGEN SATURATION: 98 % | DIASTOLIC BLOOD PRESSURE: 84 MMHG | TEMPERATURE: 98 F | BODY MASS INDEX: 23.7 KG/M2

## 2024-09-04 DIAGNOSIS — I10 PRIMARY HYPERTENSION: Primary | ICD-10-CM

## 2024-09-04 DIAGNOSIS — I10 WHITE COAT SYNDROME WITH DIAGNOSIS OF HYPERTENSION: ICD-10-CM

## 2024-09-04 PROCEDURE — 1159F MED LIST DOCD IN RCRD: CPT | Mod: CPTII,S$GLB,, | Performed by: STUDENT IN AN ORGANIZED HEALTH CARE EDUCATION/TRAINING PROGRAM

## 2024-09-04 PROCEDURE — 3044F HG A1C LEVEL LT 7.0%: CPT | Mod: CPTII,S$GLB,, | Performed by: STUDENT IN AN ORGANIZED HEALTH CARE EDUCATION/TRAINING PROGRAM

## 2024-09-04 PROCEDURE — 3008F BODY MASS INDEX DOCD: CPT | Mod: CPTII,S$GLB,, | Performed by: STUDENT IN AN ORGANIZED HEALTH CARE EDUCATION/TRAINING PROGRAM

## 2024-09-04 PROCEDURE — 3077F SYST BP >= 140 MM HG: CPT | Mod: CPTII,S$GLB,, | Performed by: STUDENT IN AN ORGANIZED HEALTH CARE EDUCATION/TRAINING PROGRAM

## 2024-09-04 PROCEDURE — 1160F RVW MEDS BY RX/DR IN RCRD: CPT | Mod: CPTII,S$GLB,, | Performed by: STUDENT IN AN ORGANIZED HEALTH CARE EDUCATION/TRAINING PROGRAM

## 2024-09-04 PROCEDURE — 3079F DIAST BP 80-89 MM HG: CPT | Mod: CPTII,S$GLB,, | Performed by: STUDENT IN AN ORGANIZED HEALTH CARE EDUCATION/TRAINING PROGRAM

## 2024-09-04 PROCEDURE — 99999 PR PBB SHADOW E&M-EST. PATIENT-LVL III: CPT | Mod: PBBFAC,,, | Performed by: STUDENT IN AN ORGANIZED HEALTH CARE EDUCATION/TRAINING PROGRAM

## 2024-09-04 PROCEDURE — 99213 OFFICE O/P EST LOW 20 MIN: CPT | Mod: S$GLB,,, | Performed by: STUDENT IN AN ORGANIZED HEALTH CARE EDUCATION/TRAINING PROGRAM

## 2024-09-04 RX ORDER — AMLODIPINE BESYLATE 5 MG/1
5 TABLET ORAL DAILY
Qty: 90 TABLET | Refills: 3 | Status: SHIPPED | OUTPATIENT
Start: 2024-09-04 | End: 2025-09-04

## 2024-09-04 NOTE — PROGRESS NOTES
"Office visit  Patient: Blossom Mccain   9/4/2024       Assessment/Plan:       1. Primary hypertension  -     amLODIPine (NORVASC) 5 MG tablet; Take 1 tablet (5 mg total) by mouth once daily.  Dispense: 90 tablet; Refill: 3        -stable, patient brought measurements from home which are within normal limits, so elevated BP today is due to white coat syndrome        -continue amlodipine 5 mg daily    2. White coat syndrome with diagnosis of hypertension        CHIEF COMPLAINT: hypertension follow up    HPI: Blossom Mccain is a 59 y.o. female who presents for follow up of hypertension.     She brought in her blood pressure readings from home and they are all wnl.  She denies blurry vision, double vision, chest pain, headache, leg swelling.      Current Outpatient Medications   Medication Instructions    amLODIPine (NORVASC) 5 mg, Oral, Daily       Lab Results   Component Value Date    HGBA1C 5.0 08/21/2024     No results found for: "MICALBCREAT"  Lab Results   Component Value Date    LDLCALC 96.6 08/21/2024    CHOL 238 (H) 08/21/2024     (H) 08/21/2024    TRIG 32 08/21/2024       Lab Results   Component Value Date     08/21/2024    K 4.3 08/21/2024     08/21/2024    CO2 25 08/21/2024     08/21/2024    BUN 6 08/21/2024    CREATININE 0.7 08/21/2024    CALCIUM 10.0 08/21/2024    PROT 8.0 08/21/2024    ALBUMIN 4.4 08/21/2024    BILITOT 0.6 08/21/2024    ALKPHOS 52 (L) 08/21/2024    AST 31 08/21/2024    ALT 19 08/21/2024    ANIONGAP 12 08/21/2024    WBC 3.67 (L) 08/21/2024    HGB 13.4 08/21/2024    HCT 41.7 08/21/2024     (H) 08/21/2024     08/21/2024    TSH 0.830 08/21/2024       No results found for: "LH", "FSH", "TOTALTESTOST", "PROGESTERONE", "ESTRADIOL", "KTFKMWPW22SE", "[id number]", "FERRITIN", "IRON", "TRANSFERRIN", "TIBC", "FESATURATED", "ZINC"      Past Medical History:   Diagnosis Date    Essential (primary) hypertension     MVP (mitral valve prolapse)      Past Surgical " "History:   Procedure Laterality Date    TUBAL LIGATION         Social History     Tobacco Use    Smoking status: Never    Smokeless tobacco: Never   Substance Use Topics    Alcohol use: Yes       family history is not on file.    Vitals:    09/04/24 1316   BP: (!) 160/84   Pulse: 99   Temp: 98.2 °F (36.8 °C)   TempSrc: Oral   SpO2: 98%   Weight: 68.5 kg (151 lb 0.2 oz)   Height: 5' 7" (1.702 m)   PainSc: 0-No pain     Objective:   Physical Exam  Vitals reviewed.   Constitutional:       General: She is not in acute distress.     Appearance: Normal appearance. She is not diaphoretic.   HENT:      Head: Normocephalic.      Right Ear: External ear normal.      Left Ear: External ear normal.   Eyes:      General: No scleral icterus.     Conjunctiva/sclera: Conjunctivae normal.   Cardiovascular:      Comments: Appears well-perfused  Pulmonary:      Effort: Pulmonary effort is normal. No respiratory distress.   Musculoskeletal:         General: Normal range of motion.      Cervical back: Normal range of motion.   Skin:     Findings: No lesion or rash.   Neurological:      General: No focal deficit present.      Mental Status: She is alert and oriented to person, place, and time.   Psychiatric:         Mood and Affect: Mood normal.         Behavior: Behavior normal.         Thought Content: Thought content normal.             Funmilayo Beebe MD  Internal Medicine and Pediatrics                            "

## 2024-09-05 ENCOUNTER — LAB VISIT (OUTPATIENT)
Dept: LAB | Facility: HOSPITAL | Age: 59
End: 2024-09-05
Attending: SURGERY
Payer: COMMERCIAL

## 2024-09-05 ENCOUNTER — DOCUMENTATION ONLY (OUTPATIENT)
Dept: SURGERY | Facility: CLINIC | Age: 59
End: 2024-09-05
Payer: COMMERCIAL

## 2024-09-05 ENCOUNTER — OFFICE VISIT (OUTPATIENT)
Dept: SURGERY | Facility: CLINIC | Age: 59
End: 2024-09-05
Payer: COMMERCIAL

## 2024-09-05 ENCOUNTER — PATIENT MESSAGE (OUTPATIENT)
Dept: SURGERY | Facility: CLINIC | Age: 59
End: 2024-09-05

## 2024-09-05 VITALS
WEIGHT: 147 LBS | SYSTOLIC BLOOD PRESSURE: 173 MMHG | DIASTOLIC BLOOD PRESSURE: 93 MMHG | HEART RATE: 111 BPM | BODY MASS INDEX: 23.07 KG/M2 | HEIGHT: 67 IN

## 2024-09-05 DIAGNOSIS — C50.411 MALIGNANT NEOPLASM OF UPPER-OUTER QUADRANT OF RIGHT BREAST IN FEMALE, ESTROGEN RECEPTOR POSITIVE: Primary | ICD-10-CM

## 2024-09-05 DIAGNOSIS — Z01.818 PREOP TESTING: ICD-10-CM

## 2024-09-05 DIAGNOSIS — C50.919 INFILTRATING DUCTAL CARCINOMA OF BREAST, UNSPECIFIED LATERALITY: Primary | ICD-10-CM

## 2024-09-05 DIAGNOSIS — C50.911 INVASIVE DUCTAL CARCINOMA OF BREAST, RIGHT: ICD-10-CM

## 2024-09-05 DIAGNOSIS — C50.911 INVASIVE DUCTAL CARCINOMA OF BREAST, RIGHT: Primary | ICD-10-CM

## 2024-09-05 DIAGNOSIS — Z17.0 MALIGNANT NEOPLASM OF UPPER-OUTER QUADRANT OF RIGHT BREAST IN FEMALE, ESTROGEN RECEPTOR POSITIVE: Primary | ICD-10-CM

## 2024-09-05 PROCEDURE — 3044F HG A1C LEVEL LT 7.0%: CPT | Mod: CPTII,S$GLB,, | Performed by: SURGERY

## 2024-09-05 PROCEDURE — 3008F BODY MASS INDEX DOCD: CPT | Mod: CPTII,S$GLB,, | Performed by: SURGERY

## 2024-09-05 PROCEDURE — 99999 PR PBB SHADOW E&M-EST. PATIENT-LVL III: CPT | Mod: PBBFAC,,, | Performed by: SURGERY

## 2024-09-05 PROCEDURE — 99205 OFFICE O/P NEW HI 60 MIN: CPT | Mod: S$GLB,,, | Performed by: SURGERY

## 2024-09-05 PROCEDURE — 36415 COLL VENOUS BLD VENIPUNCTURE: CPT | Performed by: SURGERY

## 2024-09-05 PROCEDURE — 3080F DIAST BP >= 90 MM HG: CPT | Mod: CPTII,S$GLB,, | Performed by: SURGERY

## 2024-09-05 PROCEDURE — 3077F SYST BP >= 140 MM HG: CPT | Mod: CPTII,S$GLB,, | Performed by: SURGERY

## 2024-09-05 NOTE — PROGRESS NOTES
Nurse Navigator Note:     Met with patient during her consult with Dr. Olsen.  Patient and I reviewed the information she discussed with Dr. Olsen, including treatment options, diagnosis, and future plans for workup. Patient and I went through the new patient booklet, explained some of the information and why it is provided.     Also offered patient consults with our other specialty clinics: Integrative Oncology, Survivorship and/or Women's Gynecologic needs, our breast physical therapy department for pre-op and post-operative assessments, Oncologic Psychology for psychological support, and Oncologic Nutrition for nutritional counseling. Explained to patient that all of these support services are completely optional. Discussed that physical therapy may call patient to offer pre-op appt, and what that appt would entail.     Patient was given a copy of her appointments, Dr. Olsen's card, and my card. Encouraged her to call me if she has any questions or concerns or would like to schedule any additional appointments. Verbalized understanding of all information.     Referrals placed to PT and integrative oncology. Genetics completed. Email added to support group.    MRI scheduled for 9/9/24. Pt to see Dr. Olsen after to discuss final surgical decision.    Oncology Navigation   Intake  Date of Diagnosis: 08/23/24  Cancer Type: Breast  Type of Referral: Internal  Date of Referral: 08/27/24  Initial Nurse Navigator Contact: 08/27/24  Referral to Initial Contact Timeline (days): 0  First Appointment Available: 09/05/24  Appointment Date: 09/05/24  First Available Date vs. Scheduled Date (days): 0     Treatment  Current Status: Staging work-up    Surgical Oncologist: Dr. Olsen  Consult Date: 09/05/24          Procedures: MRI  Biopsy Schedule Date: 08/23/24  Diagnostic Mammo Schedule Date: 08/15/24  MRI Schedule Date: 09/09/24  Ultrasound Schedule Date: 08/15/24    General Referrals: Integrative Medicine; Physical  Therapy  Physical Therapy Referral Date: 09/05/24    ER: Positive  MT: Negative       Support Systems: Children     Acuity      Follow Up  No follow-ups on file.

## 2024-09-05 NOTE — PROGRESS NOTES
Breast Surgery  [facility]  Department of Surgery      REFERRING PROVIDER: Doreen Saleem NP  [address] [place] [address]    Chief Complaint: Breast Cancer      Subjective:      Patient ID: Blossom Mccain is a 59 y.o. female who presents with newly diagnosed DCIS. She self-palpated a lump in her right breast on 24 while drying herself off in the shower.  A bilateral diagnostic mammogram and right breast ultrasound were performed on 8/15/24 showin cm x 1.8 cm x 2.1 cm irregularly shaped, hypoechoic mass with angular margins seen in the right breast at 10 o'clock, 6 cm from the nipple. BIRAD 5 . An ultrasound guided biopsy was performed on 24 with pathology revealing infiltrating ductal carcinoma of the breast.     Patient does not routinely do self breast exams.   Patient denies nipple discharge. Patient denies to previous breast biopsy. Patient denies a personal history of breast cancer.    Findings at that time were the following:   Tumor size: 2 cm   Tumor ndgndrndanddndend:nd nd2nd Estrogen Receptor: +   Progesterone Receptor: -   Her-2 hoang: -  Lymph node status: clinically negative   Lymphatic invasion: not identified     GYN History:  Age of menarche was 16. Age of menopause was 55.  Last menstrual period was 4 years ago in . Patient denies hormonal therapy but reports the use of OCP for about 10-15 yr prior to her tubal ligation. Patient is . Age of first live birth was 25. Patient did not breast feed.    Past Medical History:   Diagnosis Date    Essential (primary) hypertension     MVP (mitral valve prolapse)      Past Surgical History:   Procedure Laterality Date    TONSILLECTOMY      Approximately    TUBAL LIGATION       Current Outpatient Medications on File Prior to Visit   Medication Sig Dispense Refill    amLODIPine (NORVASC) 5 MG tablet Take 1 tablet (5 mg total) by mouth once daily. 90 tablet 3     No current facility-administered medications on file  prior to visit.     Social History     Socioeconomic History    Marital status: Single   Tobacco Use    Smoking status: Never    Smokeless tobacco: Never   Substance and Sexual Activity    Alcohol use: Yes    Drug use: Never    Sexual activity: Not Currently     Partners: Male     Birth control/protection: See Surgical Hx     Social Determinants of Health     Financial Resource Strain: High Risk (8/17/2024)    Overall Financial Resource Strain (CARDIA)     Difficulty of Paying Living Expenses: Hard   Food Insecurity: Food Insecurity Present (8/17/2024)    Hunger Vital Sign     Worried About Running Out of Food in the Last Year: Sometimes true     Ran Out of Food in the Last Year: Sometimes true   Physical Activity: Sufficiently Active (8/17/2024)    Exercise Vital Sign     Days of Exercise per Week: 4 days     Minutes of Exercise per Session: 60 min   Stress: Stress Concern Present (8/17/2024)    [employer] - Occupational Stress Questionnaire     Feeling of Stress : To some extent   Housing Stability: Unknown (8/17/2024)    Housing Stability Vital Sign     Unable to Pay for Housing in the Last Year: No     Family History   Problem Relation Name Age of Onset    Cancer Mother Silvia     Hypertension Mother Silvia     Cancer Father Ho     Hypertension Father Ho     Diabetes Maternal Grandfather Watson     COPD Maternal Aunt Princess     Hypertension Brother Rasheed         Review of Systems   Constitutional:  Negative for fever.   Respiratory:  Negative for shortness of breath.    Cardiovascular:  Negative for chest pain.   Gastrointestinal:  Negative for abdominal pain.   Musculoskeletal:  Negative for back pain and neck pain.   Skin:  Negative for rash.   Neurological:  Negative for facial asymmetry.   Psychiatric/Behavioral:  The patient is nervous/anxious.      Objective:   BP (!) 173/93 (BP Location: Right arm, Patient Position: Sitting, BP Method: Medium (Automatic))   Pulse (!) 111    "Ht 5' 7" (1.702 m)   Wt 66.7 kg (147 lb)   BMI 23.02 kg/m²     Physical Exam   HENT:   Head: Normocephalic and atraumatic.   Eyes: Conjunctivae are normal. No scleral icterus.   Cardiovascular:  Normal rate, regular rhythm and normal pulses.            Pulmonary/Chest: Effort normal and breath sounds normal. No accessory muscle usage or stridor. No respiratory distress. She has no wheezes. She exhibits mass. Right breast exhibits mass. Right breast exhibits no inverted nipple, no nipple discharge, no skin change and no tenderness. Left breast exhibits no inverted nipple, no mass, no nipple discharge and no skin change.       Abdominal: Soft. Normal appearance. She exhibits no mass.   Musculoskeletal: No deformity. Lymphadenopathy:      Cervical: No cervical adenopathy.      Upper Body:      Right upper body: No supraclavicular or axillary adenopathy.      Left upper body: No supraclavicular or axillary adenopathy.     Neurological: She is alert.   Skin: Skin is warm and dry.     Psychiatric: Mood and judgment normal.       Radiology review: Images personally reviewed by me in the clinic.     8/15/24 Bilateral Diagnostic Mammo/US:    Findings:  This procedure was performed using tomosynthesis. Computer-aided detection was utilized in the interpretation of this examination.     The breasts are heterogeneously dense, which may obscure small masses.      Right  Mammo Digital Diagnostic with Stefano  There is an irregularly shaped mass with spiculated margins seen in the upper outer quadrant of the right breast. The mass correlates with the palpable finding reported by the patient. There are also associated fine pleomorphic calcifications.      US Breast Right Limited  There is a 2 cm x 1.8 cm x 2.1 cm irregularly shaped, hypoechoic mass with angular margins seen in the right breast at 10 o'clock, 6 cm from the nipple. The mass correlates with the mass seen on today's mammogram. The axilla is normal.        Left  Mammo " Digital Diagnostic with Stefano  There is no evidence of suspicious masses, calcifications, or other abnormal findings in the left breast.     Impression:  Right  Mass: Right breast 2 cm x 1.8 cm x 2.1 cm mass at 10 o'clock. Assessment: 5 - Highly suggestive of malignancy. Ultrasound-guided biopsy is recommended.      Left  Mammo Digital Diagnostic with Stefano  There is no mammographic evidence of malignancy in the left breast.     I discussed the findings and recommendations for today's exam in detail with the patient.          BI-RADS Category:   Overall: 5 - Highly Suggestive of Malignancy        Recommendation:  Ultrasound-guided biopsy is recommended.    Assessment:       Encounter Diagnoses   Name Primary?    Malignant neoplasm of upper-outer quadrant of right breast in female, estrogen receptor positive Yes    Preop testing         Plan:     Options for management were discussed with the patient. We reviewed the existing data noting the equivalency of breast conserving surgery with radiation therapy and mastectomy. We also reviewed the guidelines of the National Comprehensive Cancer Network for Stage IIB breast carcinoma. We discussed the need for lumpectomy margins to be negative for carcinoma, the necessity for postoperative radiation therapy after breast conservation in most cases, the possibility of a failed or false negative sentinel lymph node biopsy and the potential need for complete lymphadenectomy for a failed or positive sentinel lymph node biopsy were fully discussed. In the setting of mastectomy, delayed or immediate reconstruction options are available and were discussed.     In the setting of lumpectomy, radiation therapy would be recommended majority of the time.  The duration and treatment side effects were discussed with the patient.  This will coordinated with the radiation oncologist pending final pathology.    We also discussed the role of systemic therapy in the treatment of early stage  breast cancer.  We discussed that this is based on tumor biology and tiff status and will be determined based on final pathology.  We discussed that if the cancer is hormone positive, endocrine therapy would be recommended in most cases and its use can reduce the risk of recurrence as well as improve survival. Side effects of treatment were briefly discussed. We also discussed the potential role for chemotherapy based on a number of factors such as tumor phenotype (ER+ vs. triple negative vs. Csf1tef+) and this would be determined in coordination with the medical oncologist.    Genetics  Due to not having available past mammography and being uncertain on surgical decision making,  we have elected to get an MRI as well as genetic testing for additional information and extent of disease. Pending those not coming back with new suspicious results the patient is leaning towards getting a lumpectomy with radiation without reconstruction.    Surgery scheduled. Follow-up in clinic roughly 14 days after surgery.     Patient was educated on breast cancer, receptors, wire localization lumpectomy, mastectomy, sentinel lymph node mapping and biopsy, axillary lymph node dissection, reconstruction, breast prosthesis with post-mastectomy bra and radiation therapy. Patient was given [facility] information [facility].  All her questions were answered.    Total time spent with the patient: 65 minutes.  45 minutes of face to face consultation and 20 minutes of chart review and coordination of care.

## 2024-09-05 NOTE — PROGRESS NOTES
Genetics Lay Navigator Note:    Nurse Navigator : CHUCKY León RN    Met with patient at her consult with Dr. Olsen (9/5/2024), to facilitate genetic testing. Set patient up with [employer] genetic counselor over the phone to complete counseling prior to testing. Patient verbalized understanding to all counseling information. [employer] brochure with number to call with questions or concerns provided to patient as well as my card. Encouraged patient to call me or [employer] at any time.     Lab appointment made and patient escorted with [employer] kit to lab for specimen draw and processing. Patient instructed that results will be provided as soon as they are available. No questions or concerns from patient about plan of care.     [employer] Genetic Pedigree & TRF scanned in media and attached to this documentation note.    [employer] Tracking # [id number]

## 2024-09-06 ENCOUNTER — TELEPHONE (OUTPATIENT)
Dept: HEMATOLOGY/ONCOLOGY | Facility: CLINIC | Age: 59
End: 2024-09-06
Payer: COMMERCIAL

## 2024-09-06 NOTE — TELEPHONE ENCOUNTER
Spoke to pt. in regards to referral for Integrative Oncology placed by Dr. Luisa Olsen. Pt. approved virtual appt for Tuesday 9/10 @ 9AM with Joanna Dubinsky, PA-C. Pt. confirmed. MA advised pt. that this is an introductory visit whereby the provider will review pt's overall health and discuss the therapies that the dept has to offer. Pt stated pt is familiar with utilizing portal for virtual appts.     ANAHI ROUSE Ext [phone]

## 2024-09-09 ENCOUNTER — HOSPITAL ENCOUNTER (OUTPATIENT)
Dept: RADIOLOGY | Facility: OTHER | Age: 59
Discharge: HOME | End: 2024-09-09
Attending: SURGERY
Payer: COMMERCIAL

## 2024-09-09 DIAGNOSIS — C50.919 INFILTRATING DUCTAL CARCINOMA OF BREAST, UNSPECIFIED LATERALITY: ICD-10-CM

## 2024-09-09 PROCEDURE — A9577 INJ MULTIHANCE: HCPCS | Performed by: SURGERY

## 2024-09-09 PROCEDURE — 25500020 PHARM REV CODE 255: Performed by: SURGERY

## 2024-09-09 PROCEDURE — 77049 MRI BREAST C-+ W/CAD BI: CPT | Mod: TC

## 2024-09-09 PROCEDURE — 77049 MRI BREAST C-+ W/CAD BI: CPT | Mod: 26,,, | Performed by: RADIOLOGY

## 2024-09-09 RX ADMIN — GADOBENATE DIMEGLUMINE 13 ML: 529 INJECTION, SOLUTION INTRAVENOUS at 02:09

## 2024-09-10 ENCOUNTER — CLINICAL SUPPORT (OUTPATIENT)
Dept: REHABILITATION | Facility: HOSPITAL | Age: 59
End: 2024-09-10
Attending: SURGERY
Payer: COMMERCIAL

## 2024-09-10 ENCOUNTER — OFFICE VISIT (OUTPATIENT)
Dept: HEMATOLOGY/ONCOLOGY | Facility: CLINIC | Age: 59
End: 2024-09-10
Payer: COMMERCIAL

## 2024-09-10 ENCOUNTER — TELEPHONE (OUTPATIENT)
Dept: HEMATOLOGY/ONCOLOGY | Facility: CLINIC | Age: 59
End: 2024-09-10
Payer: COMMERCIAL

## 2024-09-10 DIAGNOSIS — I10 PRIMARY HYPERTENSION: ICD-10-CM

## 2024-09-10 DIAGNOSIS — C50.919 INFILTRATING DUCTAL CARCINOMA OF BREAST, UNSPECIFIED LATERALITY: ICD-10-CM

## 2024-09-10 DIAGNOSIS — C50.919 INFILTRATING DUCTAL CARCINOMA OF BREAST, UNSPECIFIED LATERALITY: Primary | ICD-10-CM

## 2024-09-10 DIAGNOSIS — Z91.89 AT RISK FOR LYMPHEDEMA: Primary | ICD-10-CM

## 2024-09-10 PROCEDURE — 97161 PT EVAL LOW COMPLEX 20 MIN: CPT

## 2024-09-10 PROCEDURE — 99215 OFFICE O/P EST HI 40 MIN: CPT | Mod: 95,,, | Performed by: PHYSICIAN ASSISTANT

## 2024-09-10 PROCEDURE — 3044F HG A1C LEVEL LT 7.0%: CPT | Mod: CPTII,95,, | Performed by: PHYSICIAN ASSISTANT

## 2024-09-10 PROCEDURE — 97535 SELF CARE MNGMENT TRAINING: CPT

## 2024-09-10 NOTE — NURSING
RN Riley lvm w/return contact information regarding referral to dietitian Louise Lea RD. EDGAR Frankel.

## 2024-09-10 NOTE — PLAN OF CARE
OUTPATIENT PHYSICAL THERAPY   PRE-OP EVALUATION    Name: Blossom Mccain  Clinic Number: [id number]    Therapy Diagnosis:   Encounter Diagnoses   Name Primary?    Infiltrating ductal carcinoma of breast, unspecified laterality     At risk for lymphedema Yes        Physician: Luisa Olsen MD    Physician Orders: PT Eval and Treat   Medical Diagnosis from Referral: C50.919 (ICD-10-CM) - Infiltrating ductal carcinoma of breast, unspecified laterality   Evaluation Date: 9/10/2024  Authorization Period Expiration: 12/31/2024  Plan of Care Expiration: 9/10/2024  Progress Note Due: N/A  Visit # / Visits authorized: 1/ 1       Precautions: Standard and cancer     Time In: 10:30 am  Time Out: 11:10 am  Total Appointment Time (timed & untimed codes): 40 minutes    History   History of Present Illness: Blossom is a 59 y.o. female that presents to  [facility] at the [facility] secondary to dx of right breast cancer.    Dx: DCIS  R breast  Surgery date: TBD      Pt presents today for baseline measurements to aid in the early detection of lymphedema, UE muscle testing, postural and ROM assessment along with education of risk of lymphedema and surgical precautions post surgery. Circumferential measurements will be taken today of BL UEs for early detection of lymphedema post surgery. Pt will also be instructed in exercises to perform pre and post-surgery to insure best outcomes.     Past Medical History:   Past Medical History:   Diagnosis Date    Essential (primary) hypertension     MVP (mitral valve prolapse)        Past Surgical History:   Blossom Mccain  has a past surgical history that includes Tubal ligation and Tonsillectomy (1978).    Medications:  Blossom has a current medication list which includes the following prescription(s): amlodipine.    Allergies:  Review of patient's allergies indicates:  No Known Allergies       Hand dominance: right  Prior Therapy: none    Social  "History: lives with family  Place of Residence (Steps/Adaptations): first floor apartment, 3 steps to enter, no handrails  DME owned: none  Current functional status:  independent  Exercise routine prior to onset : none  Work:  full time at [employer], does a lot of walking and lifting                          Subjective   Pt states: every now and then a feeling, a little poke on the right side of her breast  Pain: 2/10 on VAS.     Objective   Mental status: alert & oriented x 3    Posture/Alignment   Postural examination/scapular alignment: rounded shoulders    Nutrition:  Normal    Skin integrity: intact  Edema: none noted    Sensation: Light Touch: Intact           Proprioception: Intact  Appearance: well groomed     ROM:   UPPER EXTREMITY--AROM/PROM  (R) UE: WNLs  (L) UE: WNLs     Shoulder Range of Motion:   ACTIVE ROM RIGHT LEFT   Flexion 165 175   Abduction 180 180   Extension 70 60   IR/90deg 90 85   ER/90deg 90 95     Strength: manual muscle test grades below   Upper Extremity Strength   RIGHT UE LEFT UE   Shoulder flexion: 5/5 5/5   Shoulder Abduction: 5/5 5/5   Shoulder IR 5/5 5/5   Shoulder ER 5/5 5/5   Elbow flexion: 5/5 5/5   Elbow extension: 5/5 5/5   Wrist flexion: 5/5 5/5   Wrist extension: 5/5 5/5    88.8 lbs 84.6 lbs       Baseline measurements of bilateral upper extremities for early detection of lymphedema:     LANDMARK RIGHT UPPER EXTREMITY LEFT UPPER EXTREMITY DIFFERENCE   E + 8" 33 cm 32 cm 1.0 cm   E + 6" 30 cm 28 cm 2.0 cm   E + 4" 26 cm 27 cm 1.0 cm   E + 2" 24.5 cm 24.5 cm 0 cm   Elbow 24 cm 25 cm 1.0 cm   W+ 8" 24.5 cm 24 cm 0.5 cm   W +  6" 24 cm 23.5 cm 0.5 cm   W + 4" 19 cm 19.5 cm 0.5 cm   Wrist  16 cm 15.5 cm 0.5 cm   DPC 20.5 cm 20.5 cm 0 cm   IP Thumb 7.0 cm 6.5 cm 0.5 cm       Endurance Assessment:   Evaluation   30 second Chair Rise  (adults > 59 y/o) 14 completed with no arms       Coordination:   - fine motor: within functional limits  - UE coordination: intact     - LE " coordination:  Not tested     Functional Mobility (Bed mobility, transfers)  Bed mobility: I =  independent   Roll to left: I  Roll to right: I  Supine to prone: I  Scooting to edge of bed: I  Supine to sit: I  Sit to supine: I  Transfers to bed: I  Transfers to toilet: I  Sit to stand:  I  Stand pivot:  I  Car transfers: I      ADL's:  Feeding: I = independent   Grooming: I  Hygiene: I  UB Dressing: I  LB Dressing: I  Toileting: I  Bathing: I    Gait Assessment:   - Assistive device used: none  - Assistance: independent  - Distance: community distances       Functional Limitations Reporting        Intake Outcome Measure for FOTO Shoulder Survey    Intake Score:N/A, website was unavailable at that time           Pt has no cultural, educational or language barriers to learning provided.    Treatment and Patient Education     Total Time Separate from Evaluation: 15 minutes    Patient participated in self care/home management for 10 minutes including the following:     - Role of PT in multi - disciplinary team, goals for PT  - Pt was educated in lymphedema etiology and management plans.    - Pt was provided with written risk reductions and precautions for managing lymphedema.   - Reviewed AMISHA drain care instructions.     ROM/lifting Precautions post surgery discussed -  until drains have been removed at your post op visit with your surgeon:  - do not lift affected arm above 90 degrees of shoulder flexion on the surgical side  - do not lift over 5 lbs  - do not pull or push heavy objects  - do not sleep on your stomach or surgery side     Pt was instructed in and performed therapeutic exercise for 5 minutes for postural correction and alignment, stretching and soft tissue mobility.   Exercises included:   - exaggerated deep breathing and relaxation  - scapular retractions  - fist making  - elbow flexion/extension    Pt was able to demonstrate and report understanding and performance    Written Home Exercises Provided:  yes.  Exercises were reviewed and Jessica was able to demonstrate them prior to the end of the session.  Jessica demonstrated good  understanding of the education provided.     See EMR under Patient Instructions for exercises provided 9/10/2024.      Assessment   This is a 59 y.o. female referred to outpatient physical therapy and presents with a medical diagnosis of right breast cancer and was seen today pre-operatively to assess strength and ROM of BL UEs, to take baseline circumferential measurements of BL UEs to aid in the early detection of lymphedema and provide pt education on exercises/precations post breast surgery. Pt does not exhibit any ROM impairments  Pt educated in lymphedema risks/precautions as well as ROM/lifting precautions post surgery - pt demonstrated/verbalized understanding. No goals established this visit as goals for PT will be established post surgery at follow up.      Anticipated barriers to physical therapy: none anticpated     Pt's spiritual, cultural and educational needs considered and pt agreeable to plan of care and goals as stated below:     Medical Necessity is demonstrated by the following  History  Co-morbidities and personal factors that may impact the plan of care [] LOW: no personal factors / co-morbidities  [x] MODERATE: 1-2 personal factors / co-morbidities  [] HIGH: 3+ personal factors / co-morbidities    Moderate / High Support Documentation:   Co-morbidities affecting plan of care: history of cancer, HTN    Personal Factors:   no deficits     Examination  Body Structures and Functions, activity limitations and participation restrictions that may impact the plan of care [x] LOW: addressing 1-2 elements  [] MODERATE: 3+ elements  [] HIGH: 4+ elements (please support below)    Moderate / High Support Documentation: N/A     Clinical Presentation [] LOW: stable  [x] MODERATE: Evolving  [] HIGH: Unstable     Decision Making/ Complexity Score: moderate           Plan   Schedule  patient for follow up with Physical therapy post surgery. Goals for therapy post surgery will be established at that time.     Therapist: Karen Khanna, PT  9/11/2024

## 2024-09-10 NOTE — PROGRESS NOTES
The patient location is: [place], [place]    Visit type: audiovisual    Face to Face time with patient: 20  30 minutes of total time spent on the encounter, which includes face to face time and non-face to face time preparing to see the patient (eg, review of tests), Obtaining and/or reviewing separately obtained history, Documenting clinical information in the electronic or other health record, Independently interpreting results (not separately reported) and communicating results to the patient/family/caregiver, or Care coordination (not separately reported).         Each patient to whom he or she provides medical services by telemedicine is:  (1) informed of the relationship between the physician and patient and the respective role of any other health care provider with respect to management of the patient; and (2) notified that he or she may decline to receive medical services by telemedicine and may withdraw from such care at any time.    Notes:        Integrative Health and Medicine Initial Visit      Chief Complaint:  I want to promote my overall well-being through cancer.    HPI: Blossom Mccain is a 60 y/o FEMALE recently diagnosed with breast cancer and referred to Integrative Oncology but Dr. Olsen.  Patient has PMH HTN.  She is awaiting for completion of plan after she follows up with Dr. Olsen later this week after an MRI.  Surgery has not yet been scheduled.  Patient states she sleeps well; sleeping 7-8 hours nightly and feels mostly well rested in morning.  Patient works at [employer] and is on her feet daily.   She does not have an exercise regimen outside of her work and ADLs around house.  She states she tries to eat well, rare sweets or friend foods, she states.  She is interested in improving her diet.   She notes through this diagnosis she does have some anxiety but she denies depression.   She states when she is feeling anxious she puts headphones on and listens to music or has quiet time.  She  does not call it meditation, but thinks it is something like meditation when asked.  Patient will be on endocrine therapy and already currently gets some hot flashes, though they are not too disruptive to patient and they do not disrupt her sleep.   Patient notes concern of too many appointments at this time, would like to only have Nutrition scheduled and will consider other resources through her treatment.  Patient recently started on HTN medication on 8/21/24 by her PCP (amlodipine).     Cancer/Stage/TNM:   Cancer Staging   No matching staging information was found for the patient.       Oncology History    No history exists.         Past Medical History:   Diagnosis Date    Essential (primary) hypertension     MVP (mitral valve prolapse)         Current Outpatient Medications   Medication Instructions    amLODIPine (NORVASC) 5 mg, Oral, Daily        Past Surgical History:   Procedure Laterality Date    TONSILLECTOMY  1978    Approximately    TUBAL LIGATION              7 Pillars Assessment      Sleep  How many hours of sleep per night? 7-8 hours    Resilience  Rate your current level of stress- mod  How do you manage stress?  Quiet time, music     Purpose  Do you feel you have a vision or a life purpose? Yes    Environment  Any exposures:no known exposures    Spirituality-  yes    Nutrition   Food allergies or sensitivities: no    Exercise  How would you describe your physical activity level? mod  Do you work at a sedentary job? no  What do you do for physical activity? Walking at work, ADLs      Physical Exam   There were no vitals taken for this visit.   Wt Readings from Last 3 Encounters:   09/05/24 66.7 kg (147 lb)   09/04/24 68.5 kg (151 lb 0.2 oz)   08/27/24 67.1 kg (148 lb)     Temp Readings from Last 3 Encounters:   09/04/24 98.2 °F (36.8 °C) (Oral)   08/21/24 98 °F (36.7 °C) (Oral)   08/14/24 98.8 °F (37.1 °C) (Oral)     BP Readings from Last 3 Encounters:   09/05/24 (!) 173/93   09/04/24 (!) 160/84    08/27/24 (!) 178/106     Pulse Readings from Last 3 Encounters:   09/05/24 (!) 111   09/04/24 99   08/21/24 102       Body mass index is There is no height or weight on file to calculate BMI.    Vitals reviewed.   Constitutional:       General: Patient is not in acute distress.     Appearance: Normal appearance.   HENT:      Head: Normocephalic and atraumatic.  Pulmonary:      Effort: Pulmonary effort is normal.       Review of Systems:   Cardiac:           No SOB, chest pain with exertion,edema, orthopnea  Distress:          No excessive sadness, no hopelessness, no anhedonia, no excessive worry or nervousness  Cognitive:        No trouble with memory, no difficulty making decisions  Fatigue:           Energy level adequate, performing ADL's, no morning fatigue                           Fatigue  1  / 10  ( Scale 0 - 10)   Hormonal:       + hot flashes, no night sweats  Pain:                Has no pain,  location:                          Pain 0   / 10 (Scale 0 - 10)    Neuropathy:    No numbness, no tingling, no paresthesia        Labs:   Lab Results   Component Value Date    WBC 3.67 (L) 08/21/2024    HGB 13.4 08/21/2024    HCT 41.7 08/21/2024     (H) 08/21/2024     08/21/2024           Hemoglobin A1C   Date Value Ref Range Status   08/21/2024 5.0 4.0 - 5.6 % Final     Comment:     ADA Screening Guidelines:  5.7-6.4%  Consistent with prediabetes  >or=6.5%  Consistent with diabetes    High levels of fetal hemoglobin interfere with the HbA1C  assay. Heterozygous hemoglobin variants (HbS, HgC, etc)do  not significantly interfere with this assay.   However, presence of multiple variants may affect accuracy.              Assessment:   Patient is a 59 y.o.female who arrived to Integrative Oncology for consult during breast cancer treatment.       Plan   #Nutrition: Encourage plant-forward anti-inflammatory diet with plenty of protein to promote healing, balance blood sugar and maintain muscle mass.   Patient referred to Nutrition; will have virtual appointment set up  # Sleep: Recommend 6-8 hours of restful sleep nightly; recommend strong sleep hygiene routine one hour prior to bed (no screens)  # Exercise: Recommend 60 minutes of gentle movement/light activity per day (cleaning, walking, cooking, gardening, stationary bike) at baseline and, if can tolerate, build up to at least 150 minutes (2 ½ hours) of moderate-intensity exercise weekly plus two days of resistance exercise.  Patient currently walks at work and performs ADLs at home.  We discussed the resources available for her now and also after surgery to help promote exercise regimen and to help with stress reduction.  She will reach out it interested in OT/yoga.  She has appointment lined up already with PT  # Offered acupuncture to help with hot flashes and possible side effects of endocrine therapy.  Patient would like to wait to consider.   # Offered Psychology for anxiety, depression, situational adjustment disorder  #Reviewed classes including Meditation, Yoga, and Music Therapy   #Sent meditation and Guided Imagery resources to patient  #Discussed how Integrative Oncology could help with side effects of endocrine therapy.  Discussed starting magnesium glycinate 400 mg nightly to help with hot flashes and sleep   #Follow-Up: PRN

## 2024-09-11 PROBLEM — Z91.89 AT RISK FOR LYMPHEDEMA: Status: ACTIVE | Noted: 2024-09-11

## 2024-09-12 ENCOUNTER — PATIENT MESSAGE (OUTPATIENT)
Dept: HEMATOLOGY/ONCOLOGY | Facility: CLINIC | Age: 59
End: 2024-09-12
Payer: COMMERCIAL

## 2024-09-12 ENCOUNTER — OFFICE VISIT (OUTPATIENT)
Dept: PLASTIC SURGERY | Facility: CLINIC | Age: 59
End: 2024-09-12
Payer: COMMERCIAL

## 2024-09-12 ENCOUNTER — OFFICE VISIT (OUTPATIENT)
Dept: SURGERY | Facility: CLINIC | Age: 59
End: 2024-09-12
Payer: COMMERCIAL

## 2024-09-12 VITALS
SYSTOLIC BLOOD PRESSURE: 144 MMHG | BODY MASS INDEX: 23.07 KG/M2 | DIASTOLIC BLOOD PRESSURE: 82 MMHG | HEIGHT: 67 IN | HEART RATE: 104 BPM | WEIGHT: 147 LBS

## 2024-09-12 DIAGNOSIS — Z17.0 MALIGNANT NEOPLASM OF UPPER-OUTER QUADRANT OF RIGHT BREAST IN FEMALE, ESTROGEN RECEPTOR POSITIVE: Primary | ICD-10-CM

## 2024-09-12 DIAGNOSIS — Z01.818 PREOP TESTING: ICD-10-CM

## 2024-09-12 DIAGNOSIS — C50.411 MALIGNANT NEOPLASM OF UPPER-OUTER QUADRANT OF RIGHT BREAST IN FEMALE, ESTROGEN RECEPTOR POSITIVE: Primary | ICD-10-CM

## 2024-09-12 PROCEDURE — 3044F HG A1C LEVEL LT 7.0%: CPT | Mod: CPTII,S$GLB,, | Performed by: SURGERY

## 2024-09-12 PROCEDURE — 1159F MED LIST DOCD IN RCRD: CPT | Mod: CPTII,S$GLB,, | Performed by: SURGERY

## 2024-09-12 PROCEDURE — 3079F DIAST BP 80-89 MM HG: CPT | Mod: CPTII,S$GLB,, | Performed by: SURGERY

## 2024-09-12 PROCEDURE — 99202 OFFICE O/P NEW SF 15 MIN: CPT | Mod: S$GLB,,, | Performed by: SURGERY

## 2024-09-12 PROCEDURE — 99214 OFFICE O/P EST MOD 30 MIN: CPT | Mod: S$GLB,,, | Performed by: SURGERY

## 2024-09-12 PROCEDURE — 3077F SYST BP >= 140 MM HG: CPT | Mod: CPTII,S$GLB,, | Performed by: SURGERY

## 2024-09-12 PROCEDURE — 99999 PR PBB SHADOW E&M-EST. PATIENT-LVL III: CPT | Mod: PBBFAC,,, | Performed by: SURGERY

## 2024-09-12 PROCEDURE — 3008F BODY MASS INDEX DOCD: CPT | Mod: CPTII,S$GLB,, | Performed by: SURGERY

## 2024-09-12 NOTE — PROGRESS NOTES
Breast Surgery  [facility]  Department of Surgery      REFERRING PROVIDER: No referring provider defined for this encounter.    Chief Complaint: Surgery Discussion and F/U after MRI      Subjective:      Patient ID: Blossom Mccain is a 59 y.o. female who presents with newly diagnosed DCIS. She self-palpated a lump in her right breast on 24 while drying herself off in the shower.  A bilateral diagnostic mammogram and right breast ultrasound were performed on 8/15/24 showin cm x 1.8 cm x 2.1 cm irregularly shaped, hypoechoic mass with angular margins seen in the right breast at 10 o'clock, 6 cm from the nipple. BIRAD 5 . An ultrasound guided biopsy was performed on 24 with pathology revealing infiltrating ductal carcinoma of the breast.     She returns for surgical planning following MRI - 2.2cm mass 9OC, 6CFN with a 7mm questionable axillary node.  Reviewed with Dr. Swanson - fairly low suspicion and pre-biopsy US did not show any axillary LAD, so will defer biopsy pre-operatively    Patient does not routinely do self breast exams.   Patient denies nipple discharge. Patient denies to previous breast biopsy. Patient denies a personal history of breast cancer.    Findings at that time were the following:   Tumor size: 2 cm   Tumor thgthrthathdtheth:th th4th Estrogen Receptor: +   Progesterone Receptor: -   Her-2 hoang: -  Lymph node status: clinically negative   Lymphatic invasion: not identified     GYN History:  Age of menarche was 16. Age of menopause was 55.  Last menstrual period was 4 years ago in . Patient denies hormonal therapy but reports the use of OCP for about 10-15 yr prior to her tubal ligation. Patient is . Age of first live birth was 25. Patient did not breast feed.    Past Medical History:   Diagnosis Date    Essential (primary) hypertension     MVP (mitral valve prolapse)      Past Surgical History:   Procedure Laterality Date    TONSILLECTOMY      Approximately    TUBAL LIGATION        Current Outpatient Medications on File Prior to Visit   Medication Sig Dispense Refill    amLODIPine (NORVASC) 5 MG tablet Take 1 tablet (5 mg total) by mouth once daily. 90 tablet 3     No current facility-administered medications on file prior to visit.     Social History     Socioeconomic History    Marital status: Single   Tobacco Use    Smoking status: Never    Smokeless tobacco: Never   Substance and Sexual Activity    Alcohol use: Yes    Drug use: Never    Sexual activity: Not Currently     Partners: Male     Birth control/protection: See Surgical Hx     Social Determinants of Health     Financial Resource Strain: High Risk (8/17/2024)    Overall Financial Resource Strain (CARDIA)     Difficulty of Paying Living Expenses: Hard   Food Insecurity: Food Insecurity Present (8/17/2024)    Hunger Vital Sign     Worried About Running Out of Food in the Last Year: Sometimes true     Ran Out of Food in the Last Year: Sometimes true   Physical Activity: Sufficiently Active (8/17/2024)    Exercise Vital Sign     Days of Exercise per Week: 4 days     Minutes of Exercise per Session: 60 min   Stress: Stress Concern Present (8/17/2024)    [employer] - Occupational Stress Questionnaire     Feeling of Stress : To some extent   Housing Stability: Unknown (8/17/2024)    Housing Stability Vital Sign     Unable to Pay for Housing in the Last Year: No     Family History   Problem Relation Name Age of Onset    Cancer Mother Silvia     Hypertension Mother Silvia     Cancer Father Ho     Hypertension Father Ho     Diabetes Maternal Grandfather Watson     COPD Maternal Aunt Princess     Hypertension Brother Rasheed         Review of Systems   Constitutional:  Negative for appetite change, chills, fever and unexpected weight change.   HENT:  Negative for facial swelling, postnasal drip and sore throat.    Eyes:  Negative for redness and itching.   Respiratory:  Negative for chest tightness and shortness  "of breath.    Cardiovascular:  Negative for chest pain and palpitations.   Gastrointestinal:  Negative for abdominal pain, blood in stool, diarrhea, nausea and vomiting.   Genitourinary:  Negative for difficulty urinating and dysuria.   Musculoskeletal:  Negative for arthralgias, back pain, joint swelling and neck pain.   Skin:  Negative for rash and wound.   Neurological:  Negative for dizziness, syncope and facial asymmetry.   Hematological:  Negative for adenopathy.   Psychiatric/Behavioral:  Negative for agitation. The patient is nervous/anxious.      Objective:   BP (!) 144/82   Pulse 104   Ht 5' 7" (1.702 m)   Wt 66.7 kg (147 lb)   BMI 23.02 kg/m²     Physical Exam   HENT:   Head: Normocephalic and atraumatic.   Eyes: Conjunctivae are normal. No scleral icterus.   Cardiovascular:  Normal rate, regular rhythm and normal pulses.            Pulmonary/Chest: Effort normal and breath sounds normal. No accessory muscle usage or stridor. No respiratory distress. She has no wheezes. She exhibits mass. Right breast exhibits mass. Right breast exhibits no inverted nipple, no nipple discharge, no skin change and no tenderness. Left breast exhibits no inverted nipple, no mass, no nipple discharge and no skin change.       Abdominal: Soft. Normal appearance. She exhibits no mass.   Musculoskeletal: No deformity. Lymphadenopathy:      Cervical: No cervical adenopathy.      Upper Body:      Right upper body: No supraclavicular or axillary adenopathy.      Left upper body: No supraclavicular or axillary adenopathy.     Neurological: She is alert.   Skin: Skin is warm and dry.     Psychiatric: Mood and judgment normal.       Radiology review: Images personally reviewed by me in the clinic.     8/15/24 Bilateral Diagnostic Mammo/US:    Findings:  This procedure was performed using tomosynthesis. Computer-aided detection was utilized in the interpretation of this examination.     The breasts are heterogeneously dense, which " may obscure small masses.      Right  Mammo Digital Diagnostic with Stefano  There is an irregularly shaped mass with spiculated margins seen in the upper outer quadrant of the right breast. The mass correlates with the palpable finding reported by the patient. There are also associated fine pleomorphic calcifications.      US Breast Right Limited  There is a 2 cm x 1.8 cm x 2.1 cm irregularly shaped, hypoechoic mass with angular margins seen in the right breast at 10 o'clock, 6 cm from the nipple. The mass correlates with the mass seen on today's mammogram. The axilla is normal.        Left  Mammo Digital Diagnostic with Stefano  There is no evidence of suspicious masses, calcifications, or other abnormal findings in the left breast.     Impression:  Right  Mass: Right breast 2 cm x 1.8 cm x 2.1 cm mass at 10 o'clock. Assessment: 5 - Highly suggestive of malignancy. Ultrasound-guided biopsy is recommended.      Left  Mammo Digital Diagnostic with Stefano  There is no mammographic evidence of malignancy in the left breast.     I discussed the findings and recommendations for today's exam in detail with the patient.          BI-RADS Category:   Overall: 5 - Highly Suggestive of Malignancy        Recommendation:  Ultrasound-guided biopsy is recommended.    Assessment:       Encounter Diagnoses   Name Primary?    Preop testing     Malignant neoplasm of upper-outer quadrant of right breast in female, estrogen receptor positive Yes          Plan:     Options for management were discussed with the patient. We reviewed the existing data noting the equivalency of breast conserving surgery with radiation therapy and mastectomy. We also reviewed the guidelines of the National Comprehensive Cancer Network for Stage IIB breast carcinoma. We discussed the need for lumpectomy margins to be negative for carcinoma, the necessity for postoperative radiation therapy after breast conservation in most cases, the possibility of a failed or false  negative sentinel lymph node biopsy and the potential need for complete lymphadenectomy for a failed or positive sentinel lymph node biopsy were fully discussed. In the setting of mastectomy, delayed or immediate reconstruction options are available and were discussed.     In the setting of lumpectomy, radiation therapy would be recommended majority of the time.  The duration and treatment side effects were discussed with the patient.  This will coordinated with the radiation oncologist pending final pathology.    We also discussed the role of systemic therapy in the treatment of early stage breast cancer.  We discussed that this is based on tumor biology and tiff status and will be determined based on final pathology.  We discussed that if the cancer is hormone positive, endocrine therapy would be recommended in most cases and its use can reduce the risk of recurrence as well as improve survival. Side effects of treatment were briefly discussed. We also discussed the potential role for chemotherapy based on a number of factors such as tumor phenotype (ER+ vs. triple negative vs. [id number]+) and this would be determined in coordination with the medical oncologist.    Patient has elected to proceed with a US guided right partial mastectomy with R SLNB with oncoplastic reduction with Dr. Adriano Billy.  Discussed the borderline node with Dr. Swanson.  Low suspicion and no abnormal node on pre-bx US - will proceed to SLNB rather than percutaneous biopsy.    Surgery scheduled. Follow-up in clinic roughly 14 days after surgery.     Patient was educated on breast cancer, receptors, wire localization lumpectomy, mastectomy, sentinel lymph node mapping and biopsy, axillary lymph node dissection, reconstruction, breast prosthesis with post-mastectomy bra and radiation therapy. Patient was given patient information [facility].  All her questions were answered.    Total time spent with  the patient: 30 minutes.  20 minutes of face to face consultation and 10 minutes of chart review and coordination of care.

## 2024-09-13 PROBLEM — Z17.0 MALIGNANT NEOPLASM OF UPPER-OUTER QUADRANT OF RIGHT BREAST IN FEMALE, ESTROGEN RECEPTOR POSITIVE: Status: ACTIVE | Noted: 2024-09-13

## 2024-09-13 PROBLEM — C50.411 MALIGNANT NEOPLASM OF UPPER-OUTER QUADRANT OF RIGHT BREAST IN FEMALE, ESTROGEN RECEPTOR POSITIVE: Status: ACTIVE | Noted: 2024-09-13

## 2024-09-15 NOTE — PROGRESS NOTES
The request of Dr. Nguyen in the office I met with Ms Mccain very briefly.    She was DCIS of the right breasts with a 2 cm palpable mass.  She was leaning towards breast conserving therapy and Dr. Nguyen flap she would be a good candidate for oncoplastic breast techniques.      On exam at about 10:00 a.m. she was an area of palpable fullness lateral to the right breast.    Otherwise both breasts are somewhat deflated with grade 2 ptosis.  No other significant scarring of the breasts.    I explained the benefits of oncoplastic breast techniques with the patient.  I think with her shaped breasts in the area of the mass.  That a lumpectomy would likely cause a lateral scar that would capitate after radiatio.  A new line I do think she would benefit from a mastopexy on the right side.  Also discussed that I could potentially use some excess tissue along the IMF to back fill the lumpectomy defect.  I think a small reduction on the left side for symmetry would also be needed.  I I answered any questions.    I explained that I will plan on seeing her back for a preoperative visit in the office and we can discuss surgery in more detail    Adriano Billy, DO  Plastic and Reconstructive Surgery      I spent a total of 15 minutes on the day of the visit.  This includes face to face time and non-face to face time preparing to see the patient (eg, review of tests), obtaining and/or reviewing separately obtained history, documenting clinical information in the electronic or other health record, independently interpreting results and communicating results to the patient/family/caregiver, or care coordinator.

## 2024-09-18 ENCOUNTER — TELEPHONE (OUTPATIENT)
Dept: HEMATOLOGY/ONCOLOGY | Facility: CLINIC | Age: 59
End: 2024-09-18
Payer: COMMERCIAL

## 2024-09-18 NOTE — TELEPHONE ENCOUNTER
Spoke w/ pt in regards to scheduling nutrition referral placed by Joanna Dubinsky, PA-C.  Pt approved scheduling with Louise Lea RD for virtual visit on Wednesday 10/2 @ 10AM. Pt stated pt is familiar with utilizing portal for virtual appt.       [place], [place] ext [phone]

## 2024-09-19 ENCOUNTER — TELEPHONE (OUTPATIENT)
Dept: SURGERY | Facility: CLINIC | Age: 59
End: 2024-09-19
Payer: COMMERCIAL

## 2024-09-19 NOTE — TELEPHONE ENCOUNTER
Genetic Lay Navigation Note:    Called patient with the results of her genetic testing. Informed patient that results were negative for any notable mutation. Instructed patient that we would ensure she received a copy of her results via [facility] or mail, and to call us with any questions or concerns regarding the full report. Patient verbalized understanding to all information, no questions at this time.     Patient's ordering physician made aware of results and that patient was informed of them.